# Patient Record
Sex: FEMALE | Race: WHITE | NOT HISPANIC OR LATINO | Employment: OTHER | URBAN - METROPOLITAN AREA
[De-identification: names, ages, dates, MRNs, and addresses within clinical notes are randomized per-mention and may not be internally consistent; named-entity substitution may affect disease eponyms.]

---

## 2018-06-06 ENCOUNTER — HOSPITAL ENCOUNTER (OUTPATIENT)
Dept: LAB | Facility: HOSPITAL | Age: 63
Discharge: HOME | End: 2018-06-06
Attending: FAMILY MEDICINE | Admitting: FAMILY MEDICINE

## 2018-06-06 LAB
ALBUMIN SERPL-MCNC: 4 G/DL (ref 3.5–4.8)
ALBUMIN/GLOB SERPL: 1.4 {RATIO} (ref 1–1.7)
ALP SERPL-CCNC: 59 IU/L (ref 32–91)
ALT SERPL-CCNC: 16 IU/L (ref 14–54)
ANION GAP SERPL CALC-SCNC: 9.9 MMOL/L (ref 10–20)
AST SERPL-CCNC: 20 IU/L (ref 15–41)
BASOPHILS # BLD AUTO: 0 10*3/UL (ref 0–0.2)
BASOPHILS NFR BLD AUTO: 1 % (ref 0–2)
BILIRUB SERPL-MCNC: 0.7 MG/DL (ref 0.3–1.2)
BUN SERPL-MCNC: 9 MG/DL (ref 8–20)
BUN/CREAT SERPL: 12.9 (ref 5.4–26.2)
CALCIUM SERPL-MCNC: 8.9 MG/DL (ref 8.9–10.3)
CHLORIDE SERPL-SCNC: 108 MMOL/L (ref 101–111)
CHOLEST SERPL-MCNC: 231 MG/DL
CHOLEST/HDLC SERPL: 4.3 {RATIO}
CONV CO2: 27 MMOL/L (ref 22–32)
CONV LDL CHOLESTEROL DIRECT: 154 MG/DL (ref 0–100)
CONV TOTAL PROTEIN: 6.8 G/DL (ref 6.1–7.9)
CREAT UR-MCNC: 0.7 MG/DL (ref 0.4–1)
DIFFERENTIAL METHOD BLD: (no result)
EOSINOPHIL # BLD AUTO: 0.2 10*3/UL (ref 0–0.3)
EOSINOPHIL # BLD AUTO: 4 % (ref 0–3)
ERYTHROCYTE [DISTWIDTH] IN BLOOD BY AUTOMATED COUNT: 13.5 % (ref 11.5–14.5)
GLOBULIN UR ELPH-MCNC: 2.8 G/DL (ref 2.5–3.8)
GLUCOSE SERPL-MCNC: 102 MG/DL (ref 65–99)
HCT VFR BLD AUTO: 39.2 % (ref 35–49)
HDLC SERPL-MCNC: 54 MG/DL
HGB BLD-MCNC: 13.3 G/DL (ref 12–15)
LDLC/HDLC SERPL: 2.8 {RATIO}
LIPID INTERPRETATION: ABNORMAL
LYMPHOCYTES # BLD AUTO: 1.4 10*3/UL (ref 0.8–4.8)
LYMPHOCYTES NFR BLD AUTO: 26 % (ref 18–42)
MCH RBC QN AUTO: 31.2 PG (ref 26–32)
MCHC RBC AUTO-ENTMCNC: 33.8 G/DL (ref 32–36)
MCV RBC AUTO: 92.3 FL (ref 80–94)
MONOCYTES # BLD AUTO: 0.3 10*3/UL (ref 0.1–1.3)
MONOCYTES NFR BLD AUTO: 6 % (ref 2–11)
NEUTROPHILS # BLD AUTO: 3.4 10*3/UL (ref 2.3–8.6)
NEUTROPHILS NFR BLD AUTO: 63 % (ref 50–75)
NRBC BLD AUTO-RTO: 0 /100{WBCS}
NRBC/RBC NFR BLD MANUAL: 0 10*3/UL
PLATELET # BLD AUTO: 176 10*3/UL (ref 150–450)
PMV BLD AUTO: 8.9 FL (ref 7.4–10.4)
POTASSIUM SERPL-SCNC: 3.9 MMOL/L (ref 3.6–5.1)
RBC # BLD AUTO: 4.25 10*6/UL (ref 4–5.4)
SODIUM SERPL-SCNC: 141 MMOL/L (ref 136–144)
TRIGL SERPL-MCNC: 142 MG/DL
VLDLC SERPL CALC-MCNC: 23.3 MG/DL
WBC # BLD AUTO: 5.4 10*3/UL (ref 4.5–11.5)

## 2020-07-24 ENCOUNTER — LAB (OUTPATIENT)
Dept: LAB | Facility: HOSPITAL | Age: 65
End: 2020-07-24

## 2020-07-24 ENCOUNTER — TRANSCRIBE ORDERS (OUTPATIENT)
Dept: LAB | Facility: HOSPITAL | Age: 65
End: 2020-07-24

## 2020-07-24 DIAGNOSIS — E78.81 LIPOID DERMATOARTHRITIS: ICD-10-CM

## 2020-07-24 DIAGNOSIS — E78.81 LIPOID DERMATOARTHRITIS: Primary | ICD-10-CM

## 2020-07-24 DIAGNOSIS — I10 ESSENTIAL HYPERTENSION: ICD-10-CM

## 2020-07-24 DIAGNOSIS — Z00.00 ROUTINE GENERAL MEDICAL EXAMINATION AT A HEALTH CARE FACILITY: ICD-10-CM

## 2020-07-24 LAB
ALBUMIN SERPL-MCNC: 4.4 G/DL (ref 3.5–5.2)
ALBUMIN/GLOB SERPL: 1.6 G/DL
ALP SERPL-CCNC: 56 U/L (ref 39–117)
ALT SERPL W P-5'-P-CCNC: 15 U/L (ref 1–33)
ANION GAP SERPL CALCULATED.3IONS-SCNC: 9.7 MMOL/L (ref 5–15)
AST SERPL-CCNC: 15 U/L (ref 1–32)
BASOPHILS # BLD AUTO: 0.02 10*3/MM3 (ref 0–0.2)
BASOPHILS NFR BLD AUTO: 0.4 % (ref 0–1.5)
BILIRUB SERPL-MCNC: 0.5 MG/DL (ref 0–1.2)
BUN SERPL-MCNC: 12 MG/DL (ref 8–23)
BUN/CREAT SERPL: 15.6 (ref 7–25)
CALCIUM SPEC-SCNC: 9.4 MG/DL (ref 8.6–10.5)
CHLORIDE SERPL-SCNC: 105 MMOL/L (ref 98–107)
CHOLEST SERPL-MCNC: 234 MG/DL (ref 0–200)
CO2 SERPL-SCNC: 25.3 MMOL/L (ref 22–29)
CREAT SERPL-MCNC: 0.77 MG/DL (ref 0.57–1)
DEPRECATED RDW RBC AUTO: 41.6 FL (ref 37–54)
EOSINOPHIL # BLD AUTO: 0.13 10*3/MM3 (ref 0–0.4)
EOSINOPHIL NFR BLD AUTO: 2.4 % (ref 0.3–6.2)
ERYTHROCYTE [DISTWIDTH] IN BLOOD BY AUTOMATED COUNT: 12.8 % (ref 12.3–15.4)
GFR SERPL CREATININE-BSD FRML MDRD: 75 ML/MIN/1.73
GLOBULIN UR ELPH-MCNC: 2.8 GM/DL
GLUCOSE SERPL-MCNC: 96 MG/DL (ref 65–99)
HCT VFR BLD AUTO: 36.9 % (ref 34–46.6)
HDLC SERPL-MCNC: 51 MG/DL (ref 40–60)
HGB BLD-MCNC: 12.6 G/DL (ref 12–15.9)
IMM GRANULOCYTES # BLD AUTO: 0.02 10*3/MM3 (ref 0–0.05)
IMM GRANULOCYTES NFR BLD AUTO: 0.4 % (ref 0–0.5)
LDLC SERPL CALC-MCNC: 148 MG/DL (ref 0–100)
LDLC/HDLC SERPL: 2.9 {RATIO}
LYMPHOCYTES # BLD AUTO: 1.42 10*3/MM3 (ref 0.7–3.1)
LYMPHOCYTES NFR BLD AUTO: 26.2 % (ref 19.6–45.3)
MCH RBC QN AUTO: 30.4 PG (ref 26.6–33)
MCHC RBC AUTO-ENTMCNC: 34.1 G/DL (ref 31.5–35.7)
MCV RBC AUTO: 88.9 FL (ref 79–97)
MONOCYTES # BLD AUTO: 0.4 10*3/MM3 (ref 0.1–0.9)
MONOCYTES NFR BLD AUTO: 7.4 % (ref 5–12)
NEUTROPHILS NFR BLD AUTO: 3.44 10*3/MM3 (ref 1.7–7)
NEUTROPHILS NFR BLD AUTO: 63.2 % (ref 42.7–76)
NRBC BLD AUTO-RTO: 0 /100 WBC (ref 0–0.2)
PLATELET # BLD AUTO: 179 10*3/MM3 (ref 140–450)
PMV BLD AUTO: 10.8 FL (ref 6–12)
POTASSIUM SERPL-SCNC: 3.9 MMOL/L (ref 3.5–5.2)
PROT SERPL-MCNC: 7.2 G/DL (ref 6–8.5)
RBC # BLD AUTO: 4.15 10*6/MM3 (ref 3.77–5.28)
SODIUM SERPL-SCNC: 140 MMOL/L (ref 136–145)
TRIGL SERPL-MCNC: 176 MG/DL (ref 0–150)
TSH SERPL DL<=0.05 MIU/L-ACNC: 1.99 UIU/ML (ref 0.27–4.2)
VLDLC SERPL-MCNC: 35.2 MG/DL (ref 5–40)
WBC # BLD AUTO: 5.43 10*3/MM3 (ref 3.4–10.8)

## 2020-07-24 PROCEDURE — 80061 LIPID PANEL: CPT

## 2020-07-24 PROCEDURE — 80053 COMPREHEN METABOLIC PANEL: CPT

## 2020-07-24 PROCEDURE — 36415 COLL VENOUS BLD VENIPUNCTURE: CPT

## 2020-07-24 PROCEDURE — 84443 ASSAY THYROID STIM HORMONE: CPT

## 2020-07-24 PROCEDURE — 85025 COMPLETE CBC W/AUTO DIFF WBC: CPT

## 2021-04-28 ENCOUNTER — LAB (OUTPATIENT)
Dept: LAB | Facility: HOSPITAL | Age: 66
End: 2021-04-28

## 2021-04-28 ENCOUNTER — TRANSCRIBE ORDERS (OUTPATIENT)
Dept: ADMINISTRATIVE | Facility: HOSPITAL | Age: 66
End: 2021-04-28

## 2021-04-28 DIAGNOSIS — F34.1 DYSTHYMIC DISORDER: ICD-10-CM

## 2021-04-28 DIAGNOSIS — I10 HYPERTENSION, UNSPECIFIED TYPE: ICD-10-CM

## 2021-04-28 DIAGNOSIS — E78.81 LIPOID DERMATOARTHRITIS: ICD-10-CM

## 2021-04-28 DIAGNOSIS — E78.81 LIPOID DERMATOARTHRITIS: Primary | ICD-10-CM

## 2021-04-28 LAB
ALBUMIN SERPL-MCNC: 4.1 G/DL (ref 3.5–5.2)
ALBUMIN/GLOB SERPL: 1.3 G/DL
ALP SERPL-CCNC: 74 U/L (ref 39–117)
ALT SERPL W P-5'-P-CCNC: 12 U/L (ref 1–33)
ANION GAP SERPL CALCULATED.3IONS-SCNC: 10.3 MMOL/L (ref 5–15)
AST SERPL-CCNC: 14 U/L (ref 1–32)
BASOPHILS # BLD AUTO: 0.02 10*3/MM3 (ref 0–0.2)
BASOPHILS NFR BLD AUTO: 0.4 % (ref 0–1.5)
BILIRUB SERPL-MCNC: 0.4 MG/DL (ref 0–1.2)
BUN SERPL-MCNC: 12 MG/DL (ref 8–23)
BUN/CREAT SERPL: 16 (ref 7–25)
CALCIUM SPEC-SCNC: 8.7 MG/DL (ref 8.6–10.5)
CHLORIDE SERPL-SCNC: 106 MMOL/L (ref 98–107)
CHOLEST SERPL-MCNC: 229 MG/DL (ref 0–200)
CO2 SERPL-SCNC: 25.7 MMOL/L (ref 22–29)
CREAT SERPL-MCNC: 0.75 MG/DL (ref 0.57–1)
DEPRECATED RDW RBC AUTO: 42.7 FL (ref 37–54)
EOSINOPHIL # BLD AUTO: 0.13 10*3/MM3 (ref 0–0.4)
EOSINOPHIL NFR BLD AUTO: 2.4 % (ref 0.3–6.2)
ERYTHROCYTE [DISTWIDTH] IN BLOOD BY AUTOMATED COUNT: 13.1 % (ref 12.3–15.4)
GFR SERPL CREATININE-BSD FRML MDRD: 77 ML/MIN/1.73
GLOBULIN UR ELPH-MCNC: 3.1 GM/DL
GLUCOSE SERPL-MCNC: 100 MG/DL (ref 65–99)
HCT VFR BLD AUTO: 36.6 % (ref 34–46.6)
HDLC SERPL-MCNC: 40 MG/DL (ref 40–60)
HGB BLD-MCNC: 12.4 G/DL (ref 12–15.9)
IMM GRANULOCYTES # BLD AUTO: 0.02 10*3/MM3 (ref 0–0.05)
IMM GRANULOCYTES NFR BLD AUTO: 0.4 % (ref 0–0.5)
LDLC SERPL CALC-MCNC: 151 MG/DL (ref 0–100)
LDLC/HDLC SERPL: 3.7 {RATIO}
LYMPHOCYTES # BLD AUTO: 1.14 10*3/MM3 (ref 0.7–3.1)
LYMPHOCYTES NFR BLD AUTO: 21 % (ref 19.6–45.3)
MCH RBC QN AUTO: 30.5 PG (ref 26.6–33)
MCHC RBC AUTO-ENTMCNC: 33.9 G/DL (ref 31.5–35.7)
MCV RBC AUTO: 90.1 FL (ref 79–97)
MONOCYTES # BLD AUTO: 0.45 10*3/MM3 (ref 0.1–0.9)
MONOCYTES NFR BLD AUTO: 8.3 % (ref 5–12)
NEUTROPHILS NFR BLD AUTO: 3.67 10*3/MM3 (ref 1.7–7)
NEUTROPHILS NFR BLD AUTO: 67.5 % (ref 42.7–76)
NRBC BLD AUTO-RTO: 0 /100 WBC (ref 0–0.2)
PLATELET # BLD AUTO: 209 10*3/MM3 (ref 140–450)
PMV BLD AUTO: 10.6 FL (ref 6–12)
POTASSIUM SERPL-SCNC: 4.2 MMOL/L (ref 3.5–5.2)
PROT SERPL-MCNC: 7.2 G/DL (ref 6–8.5)
RBC # BLD AUTO: 4.06 10*6/MM3 (ref 3.77–5.28)
SODIUM SERPL-SCNC: 142 MMOL/L (ref 136–145)
TRIGL SERPL-MCNC: 206 MG/DL (ref 0–150)
TSH SERPL DL<=0.05 MIU/L-ACNC: 2.97 UIU/ML (ref 0.27–4.2)
VLDLC SERPL-MCNC: 38 MG/DL (ref 5–40)
WBC # BLD AUTO: 5.43 10*3/MM3 (ref 3.4–10.8)

## 2021-04-28 PROCEDURE — 80053 COMPREHEN METABOLIC PANEL: CPT

## 2021-04-28 PROCEDURE — 80061 LIPID PANEL: CPT

## 2021-04-28 PROCEDURE — 85025 COMPLETE CBC W/AUTO DIFF WBC: CPT

## 2021-04-28 PROCEDURE — 36415 COLL VENOUS BLD VENIPUNCTURE: CPT

## 2021-04-28 PROCEDURE — 84443 ASSAY THYROID STIM HORMONE: CPT

## 2021-10-29 ENCOUNTER — TRANSCRIBE ORDERS (OUTPATIENT)
Dept: ADMINISTRATIVE | Facility: HOSPITAL | Age: 66
End: 2021-10-29

## 2021-10-29 ENCOUNTER — LAB (OUTPATIENT)
Dept: LAB | Facility: HOSPITAL | Age: 66
End: 2021-10-29

## 2021-10-29 DIAGNOSIS — I10 ESSENTIAL HYPERTENSION, MALIGNANT: ICD-10-CM

## 2021-10-29 DIAGNOSIS — F34.1 DYSTHYMIC DISORDER: ICD-10-CM

## 2021-10-29 DIAGNOSIS — E78.81 LIPOID DERMATOARTHRITIS: ICD-10-CM

## 2021-10-29 DIAGNOSIS — E78.81 LIPOID DERMATOARTHRITIS: Primary | ICD-10-CM

## 2021-10-29 LAB
25(OH)D3 SERPL-MCNC: 25.8 NG/ML
ALBUMIN SERPL-MCNC: 4.6 G/DL (ref 3.5–5.2)
ALBUMIN/GLOB SERPL: 1.8 G/DL
ALP SERPL-CCNC: 68 U/L (ref 39–117)
ALT SERPL W P-5'-P-CCNC: 14 U/L (ref 1–33)
ANION GAP SERPL CALCULATED.3IONS-SCNC: 10.2 MMOL/L (ref 5–15)
AST SERPL-CCNC: 13 U/L (ref 1–32)
BILIRUB SERPL-MCNC: 0.4 MG/DL (ref 0–1.2)
BUN SERPL-MCNC: 17 MG/DL (ref 8–23)
BUN/CREAT SERPL: 20.5 (ref 7–25)
CALCIUM SPEC-SCNC: 9 MG/DL (ref 8.6–10.5)
CHLORIDE SERPL-SCNC: 104 MMOL/L (ref 98–107)
CHOLEST SERPL-MCNC: 263 MG/DL (ref 0–200)
CO2 SERPL-SCNC: 25.8 MMOL/L (ref 22–29)
CREAT SERPL-MCNC: 0.83 MG/DL (ref 0.57–1)
DEPRECATED RDW RBC AUTO: 42.8 FL (ref 37–54)
ERYTHROCYTE [DISTWIDTH] IN BLOOD BY AUTOMATED COUNT: 13 % (ref 12.3–15.4)
GFR SERPL CREATININE-BSD FRML MDRD: 69 ML/MIN/1.73
GLOBULIN UR ELPH-MCNC: 2.6 GM/DL
GLUCOSE SERPL-MCNC: 98 MG/DL (ref 65–99)
HCT VFR BLD AUTO: 38.3 % (ref 34–46.6)
HDLC SERPL-MCNC: 51 MG/DL (ref 40–60)
HGB BLD-MCNC: 13.1 G/DL (ref 12–15.9)
LDLC SERPL CALC-MCNC: 188 MG/DL (ref 0–100)
LDLC/HDLC SERPL: 3.64 {RATIO}
MCH RBC QN AUTO: 31 PG (ref 26.6–33)
MCHC RBC AUTO-ENTMCNC: 34.2 G/DL (ref 31.5–35.7)
MCV RBC AUTO: 90.5 FL (ref 79–97)
PLATELET # BLD AUTO: 183 10*3/MM3 (ref 140–450)
PMV BLD AUTO: 10.7 FL (ref 6–12)
POTASSIUM SERPL-SCNC: 4.2 MMOL/L (ref 3.5–5.2)
PROT SERPL-MCNC: 7.2 G/DL (ref 6–8.5)
RBC # BLD AUTO: 4.23 10*6/MM3 (ref 3.77–5.28)
SODIUM SERPL-SCNC: 140 MMOL/L (ref 136–145)
TRIGL SERPL-MCNC: 132 MG/DL (ref 0–150)
TSH SERPL DL<=0.05 MIU/L-ACNC: 2.09 UIU/ML (ref 0.27–4.2)
VLDLC SERPL-MCNC: 24 MG/DL (ref 5–40)
WBC # BLD AUTO: 5.39 10*3/MM3 (ref 3.4–10.8)

## 2021-10-29 PROCEDURE — 82306 VITAMIN D 25 HYDROXY: CPT

## 2021-10-29 PROCEDURE — 80053 COMPREHEN METABOLIC PANEL: CPT

## 2021-10-29 PROCEDURE — 80061 LIPID PANEL: CPT

## 2021-10-29 PROCEDURE — 36415 COLL VENOUS BLD VENIPUNCTURE: CPT

## 2021-10-29 PROCEDURE — 85027 COMPLETE CBC AUTOMATED: CPT

## 2021-10-29 PROCEDURE — 84443 ASSAY THYROID STIM HORMONE: CPT

## 2022-02-02 ENCOUNTER — LAB (OUTPATIENT)
Dept: LAB | Facility: HOSPITAL | Age: 67
End: 2022-02-02

## 2022-02-02 ENCOUNTER — TRANSCRIBE ORDERS (OUTPATIENT)
Dept: ADMINISTRATIVE | Facility: HOSPITAL | Age: 67
End: 2022-02-02

## 2022-02-02 DIAGNOSIS — Z11.52 ENCOUNTER FOR SCREENING FOR SEVERE ACUTE RESPIRATORY SYNDROME CORONAVIRUS 2 (SARS-COV-2) INFECTION: Primary | ICD-10-CM

## 2022-02-02 DIAGNOSIS — Z11.52 ENCOUNTER FOR SCREENING FOR SEVERE ACUTE RESPIRATORY SYNDROME CORONAVIRUS 2 (SARS-COV-2) INFECTION: ICD-10-CM

## 2022-02-02 LAB — SARS-COV-2 ORF1AB RESP QL NAA+PROBE: DETECTED

## 2022-02-02 PROCEDURE — U0004 COV-19 TEST NON-CDC HGH THRU: HCPCS

## 2022-02-02 PROCEDURE — U0005 INFEC AGEN DETEC AMPLI PROBE: HCPCS

## 2022-02-02 PROCEDURE — C9803 HOPD COVID-19 SPEC COLLECT: HCPCS

## 2022-09-18 NOTE — PROGRESS NOTES
Encounter Date:09/21/2022      Patient ID: Deepika Sarabia is a 67 y.o. female.    Chief Complaint:  Hypertension  Dyslipidemia  Family history of coronary artery disease    History of Present Illness  The patient is a pleasant 67-year-old white female is here for evaluation of above problems.  Patient is asymptomatic.  Patient has strong family history of coronary artery disease.  Several members in the family had heart transplantation.  Patient has history of hypertension and dyslipidemia.    The patient has been without any chest discomfort ,shortness of breath, palpitations, dizziness or syncope.  Denies having any headache ,abdominal pain ,nausea, vomiting , diarrhea constipation, loss of weight or loss of appetite.  Denies having any excessive bruising ,hematuria or blood in the stool.    Review of all systems negative except as indicated.    Reviewed ROS.    Assessment and Plan     ]]]]]]]]]]]]]]]]]]]]  Impression  =========  -Hypertension dyslipidemia  Normal echocardiogram 2008    - Denies any surgical problems.    - Strong family history of heart disease.  Several members of the family had heart transplantation.    - Non-smoker    - No known allergies  =============  Plan  ===========  Patient has strong family history of coronary artery disease.  Patient is asymptomatic without any cardiac or noncardiac symptoms at this time.  EKG showed sinus rhythm without ischemic changes.    Hypertension-blood pressure is well controlled.  Blood pressure 124/80.  Patient is on losartan.    Dyslipidemia-recent total cholesterol 270 and .  Patient prefers to not go on any medications.  Diet and exercise was discussed.  Have indicated to her about controlling her cholesterol better especially in view of significant family history of heart problems and coronary artery disease and heart transplantation.    Have discussed with her about having screening treadmill test and possibly an echocardiogram.  Patient would  like to hold off on the testing at this time.  Echocardiogram from 2008 was reviewed and was normal.    Medications were reviewed and updated.    Follow-up in the office in 6 months.  Further plan will depend on patient's progress.  ]]]]]]]]]]]]]]]]]]]               Diagnosis Plan   1. Essential hypertension     2. Dyslipidemia     3. Family history of coronary artery disease     LAB RESULTS (LAST 7 DAYS)    CBC        BMP        CMP         BNP        TROPONIN        CoAg        Creatinine Clearance  CrCl cannot be calculated (Patient's most recent lab result is older than the maximum 30 days allowed.).    ABG        Radiology  No radiology results for the last day                The following portions of the patient's history were reviewed and updated as appropriate: allergies, current medications, past family history, past medical history, past social history, past surgical history and problem list.    Review of Systems   Constitutional: Negative for fever and malaise/fatigue.   Cardiovascular: Negative for chest pain, dyspnea on exertion and palpitations.   Respiratory: Negative for cough and shortness of breath.    Skin: Negative for rash.   Gastrointestinal: Negative for abdominal pain, nausea and vomiting.   Neurological: Negative for focal weakness and headaches.   All other systems reviewed and are negative.        Current Outpatient Medications:   •  Ibuprofen (ADVIL PO), ADVIL CAPS, Disp: , Rfl:   •  losartan (COZAAR) 100 MG tablet, , Disp: , Rfl:   •  sertraline (ZOLOFT) 50 MG tablet, , Disp: , Rfl:     No Known Allergies    Family History   Problem Relation Age of Onset   • Heart disease Mother         Cardiomyopathy   • Heart disease Brother         Heart transplant   • Heart disease Brother         Heart transplant   • Heart disease Brother         Heart transplant       History reviewed. No pertinent surgical history.    Past Medical History:   Diagnosis Date   • Hyperlipidemia    • Hypertension   "      Family History   Problem Relation Age of Onset   • Heart disease Mother         Cardiomyopathy   • Heart disease Brother         Heart transplant   • Heart disease Brother         Heart transplant   • Heart disease Brother         Heart transplant       Social History     Socioeconomic History   • Marital status:    Tobacco Use   • Smoking status: Never Smoker   • Smokeless tobacco: Never Used   Vaping Use   • Vaping Use: Never used   Substance and Sexual Activity   • Alcohol use: Never   • Drug use: Never   • Sexual activity: Not Currently     Partners: Female     Birth control/protection: Pill, Post-menopausal           ECG 12 Lead    Date/Time: 9/21/2022 4:22 PM  Performed by: Gaudencio Young MD  Authorized by: Gaudencio Young MD   Comparison: compared with previous ECG   Comparison to previous ECG: Normal sinus rhythm nonspecific ST-T wave changes 75/min no ectopy no prior tracing for comparison.                Objective:       Physical Exam    /80 (BP Location: Right arm, Patient Position: Sitting, Cuff Size: Adult)   Pulse 73   Ht 163.8 cm (64.5\")   Wt 93.1 kg (205 lb 3.2 oz)   SpO2 98%   BMI 34.68 kg/m²   The patient is alert, oriented and in no distress.    Vital signs as noted above.    Head and neck revealed no carotid bruits or jugular venous distension.  No thyromegaly or lymphadenopathy is present.    Lungs clear.  No wheezing.  Breath sounds are normal bilaterally.    Heart normal first and second heart sounds.  No murmur..  No pericardial rub is present.  No gallop is present.    Abdomen soft and nontender.  No organomegaly is present.    Extremities revealed good peripheral pulses without any pedal edema.    Skin warm and dry.    Musculoskeletal system is grossly normal.    CNS grossly normal.    Reviewed and updated.        "

## 2022-09-21 ENCOUNTER — OFFICE VISIT (OUTPATIENT)
Dept: CARDIOLOGY | Facility: CLINIC | Age: 67
End: 2022-09-21

## 2022-09-21 VITALS
SYSTOLIC BLOOD PRESSURE: 124 MMHG | BODY MASS INDEX: 34.19 KG/M2 | HEART RATE: 73 BPM | HEIGHT: 65 IN | OXYGEN SATURATION: 98 % | WEIGHT: 205.2 LBS | DIASTOLIC BLOOD PRESSURE: 80 MMHG

## 2022-09-21 DIAGNOSIS — Z82.49 FAMILY HISTORY OF CORONARY ARTERY DISEASE: ICD-10-CM

## 2022-09-21 DIAGNOSIS — E78.5 DYSLIPIDEMIA: ICD-10-CM

## 2022-09-21 DIAGNOSIS — I10 ESSENTIAL HYPERTENSION: Primary | ICD-10-CM

## 2022-09-21 PROCEDURE — 93000 ELECTROCARDIOGRAM COMPLETE: CPT | Performed by: INTERNAL MEDICINE

## 2022-09-21 PROCEDURE — 99204 OFFICE O/P NEW MOD 45 MIN: CPT | Performed by: INTERNAL MEDICINE

## 2022-09-21 RX ORDER — LOSARTAN POTASSIUM 100 MG/1
TABLET ORAL
COMMUNITY
Start: 2022-07-18

## 2022-09-21 RX ORDER — LOSARTAN POTASSIUM 25 MG/1
TABLET ORAL
COMMUNITY
Start: 2012-03-08 | End: 2022-09-21 | Stop reason: DRUGHIGH

## 2022-12-13 ENCOUNTER — OFFICE (OUTPATIENT)
Dept: URBAN - METROPOLITAN AREA PATHOLOGY 4 | Facility: PATHOLOGY | Age: 67
End: 2022-12-13

## 2022-12-13 ENCOUNTER — OFFICE (OUTPATIENT)
Dept: URBAN - METROPOLITAN AREA PATHOLOGY 4 | Facility: PATHOLOGY | Age: 67
End: 2022-12-13
Payer: COMMERCIAL

## 2022-12-13 ENCOUNTER — ON CAMPUS - OUTPATIENT (OUTPATIENT)
Dept: URBAN - METROPOLITAN AREA HOSPITAL 2 | Facility: HOSPITAL | Age: 67
End: 2022-12-13

## 2022-12-13 VITALS
DIASTOLIC BLOOD PRESSURE: 89 MMHG | OXYGEN SATURATION: 96 % | TEMPERATURE: 97 F | SYSTOLIC BLOOD PRESSURE: 158 MMHG | SYSTOLIC BLOOD PRESSURE: 86 MMHG | OXYGEN SATURATION: 100 % | OXYGEN SATURATION: 98 % | DIASTOLIC BLOOD PRESSURE: 82 MMHG | WEIGHT: 200 LBS | DIASTOLIC BLOOD PRESSURE: 53 MMHG | HEART RATE: 73 BPM | SYSTOLIC BLOOD PRESSURE: 97 MMHG | SYSTOLIC BLOOD PRESSURE: 112 MMHG | SYSTOLIC BLOOD PRESSURE: 107 MMHG | HEART RATE: 67 BPM | SYSTOLIC BLOOD PRESSURE: 83 MMHG | RESPIRATION RATE: 15 BRPM | DIASTOLIC BLOOD PRESSURE: 134 MMHG | RESPIRATION RATE: 17 BRPM | DIASTOLIC BLOOD PRESSURE: 67 MMHG | DIASTOLIC BLOOD PRESSURE: 51 MMHG | HEIGHT: 64 IN | SYSTOLIC BLOOD PRESSURE: 130 MMHG | DIASTOLIC BLOOD PRESSURE: 74 MMHG | OXYGEN SATURATION: 99 % | DIASTOLIC BLOOD PRESSURE: 55 MMHG | HEART RATE: 66 BPM | SYSTOLIC BLOOD PRESSURE: 128 MMHG | HEART RATE: 69 BPM | RESPIRATION RATE: 16 BRPM

## 2022-12-13 DIAGNOSIS — K62.1 RECTAL POLYP: ICD-10-CM

## 2022-12-13 DIAGNOSIS — Z12.11 ENCOUNTER FOR SCREENING FOR MALIGNANT NEOPLASM OF COLON: ICD-10-CM

## 2022-12-13 LAB
GI HISTOLOGY: A. UNSPECIFIED: (no result)
GI HISTOLOGY: PDF REPORT: (no result)

## 2022-12-13 PROCEDURE — 88305 TISSUE EXAM BY PATHOLOGIST: CPT | Mod: 26 | Performed by: INTERNAL MEDICINE

## 2022-12-13 PROCEDURE — 45385 COLONOSCOPY W/LESION REMOVAL: CPT | Mod: PT | Performed by: INTERNAL MEDICINE

## 2023-03-12 NOTE — PROGRESS NOTES
Encounter Date:03/22/2023    Last seen 9/21/2022      Patient ID: Deepika Sarabia is a 67 y.o. female.    Chief Complaint:    Hypertension  Dyslipidemia  Family history of coronary artery disease     History of Present Illness  Patient had 1 brief episode lasting for few seconds of A-fib as indicated by Apple Watch.  Patient was asymptomatic.    Since I have last seen, the patient has been without any chest discomfort ,shortness of breath, palpitations, dizziness or syncope.  Denies having any headache ,abdominal pain ,nausea, vomiting , diarrhea constipation, loss of weight or loss of appetite.  Denies having any excessive bruising ,hematuria or blood in the stool.    Review of all systems negative except as indicated.    Reviewed ROS.  Assessment and Plan      ]]]]]]]]]]]]]]]]]]]]  Impression  =========  -Hypertension dyslipidemia.    Normal echocardiogram 2008     - Denies any surgical problems.     - Strong family history of heart disease.  Several members of the family had heart transplantation.     - Non-smoker     - No known allergies  =============  Plan  ===========  Patient has strong family history of coronary artery disease.  Patient is asymptomatic without any cardiac or noncardiac symptoms at this time.  EKG showed sinus rhythm without ischemic changes.  3/22/2023     Hypertension-blood pressure is well controlled.  Blood pressure 134/84  Patient is on losartan.     Dyslipidemia-recent total cholesterol 270 and .  Patient prefers to not go on any medications.  Diet and exercise was discussed.  Have indicated to her about controlling her cholesterol better especially in view of significant family history of heart problems and coronary artery disease and heart transplantation.     Apple Watch indicated 1 short episode of A-fib lasting for few seconds.  No other rhythm issues reported.  Patient was asymptomatic.    Medications were reviewed and updated.     Follow-up in the office in 6  months.    Further plan will depend on patient's progress.  ]]]]]]]]]]]]]]]]]]]                 Diagnosis Plan   1. Essential hypertension        2. Dyslipidemia        3. Family history of coronary artery disease        LAB RESULTS (LAST 7 DAYS)    CBC        BMP        CMP         BNP        TROPONIN        CoAg        Creatinine Clearance  CrCl cannot be calculated (Patient's most recent lab result is older than the maximum 30 days allowed.).    ABG        Radiology  No radiology results for the last day                The following portions of the patient's history were reviewed and updated as appropriate: allergies, current medications, past family history, past medical history, past social history, past surgical history and problem list.    Review of Systems   Constitutional: Negative for malaise/fatigue.   Cardiovascular: Negative for chest pain, dyspnea on exertion, leg swelling and palpitations.   Respiratory: Negative for cough and shortness of breath.    Gastrointestinal: Negative for abdominal pain, nausea and vomiting.   Neurological: Negative for dizziness, focal weakness, headaches, light-headedness and numbness.   All other systems reviewed and are negative.        Current Outpatient Medications:   •  Ibuprofen (ADVIL PO), ADVIL CAPS, Disp: , Rfl:   •  losartan (COZAAR) 100 MG tablet, , Disp: , Rfl:   •  sertraline (ZOLOFT) 50 MG tablet, , Disp: , Rfl:     No Known Allergies    Family History   Problem Relation Age of Onset   • Heart disease Mother         Cardiomyopathy   • Heart disease Brother         Heart transplant   • Heart disease Brother         Heart transplant   • Heart disease Brother         Heart transplant       No past surgical history on file.    Past Medical History:   Diagnosis Date   • Hyperlipidemia    • Hypertension        Family History   Problem Relation Age of Onset   • Heart disease Mother         Cardiomyopathy   • Heart disease Brother         Heart transplant   • Heart  disease Brother         Heart transplant   • Heart disease Brother         Heart transplant       Social History     Socioeconomic History   • Marital status:    Tobacco Use   • Smoking status: Never   • Smokeless tobacco: Never   Vaping Use   • Vaping Use: Never used   Substance and Sexual Activity   • Alcohol use: Never   • Drug use: Never   • Sexual activity: Not Currently     Partners: Female     Birth control/protection: Pill, Post-menopausal           ECG 12 Lead    Date/Time: 3/22/2023 2:21 PM  Performed by: Gaudencio Young MD  Authorized by: Gaudencio Young MD   Comparison: compared with previous ECG   Similar to previous ECG  Comparison to previous ECG: Normal sinus rhythm nonspecific ST-T wave changes normal axis normal intervals no ectopy 77/min no significant change from 9/21/2022                Objective:       Physical Exam    There were no vitals taken for this visit.  The patient is alert, oriented and in no distress.    Vital signs as noted above.    Head and neck revealed no carotid bruits or jugular venous distension.  No thyromegaly or lymphadenopathy is present.    Lungs clear.  No wheezing.  Breath sounds are normal bilaterally.    Heart normal first and second heart sounds.  No murmur..  No pericardial rub is present.  No gallop is present.    Abdomen soft and nontender.  No organomegaly is present.    Extremities revealed good peripheral pulses without any pedal edema.    Skin warm and dry.    Musculoskeletal system is grossly normal.    CNS grossly normal.    Reviewed and updated.

## 2023-03-22 ENCOUNTER — OFFICE VISIT (OUTPATIENT)
Dept: CARDIOLOGY | Facility: CLINIC | Age: 68
End: 2023-03-22
Payer: MEDICARE

## 2023-03-22 VITALS
OXYGEN SATURATION: 98 % | HEART RATE: 74 BPM | DIASTOLIC BLOOD PRESSURE: 84 MMHG | WEIGHT: 199 LBS | BODY MASS INDEX: 33.15 KG/M2 | HEIGHT: 65 IN | SYSTOLIC BLOOD PRESSURE: 134 MMHG

## 2023-03-22 DIAGNOSIS — E78.5 DYSLIPIDEMIA: ICD-10-CM

## 2023-03-22 DIAGNOSIS — I10 ESSENTIAL HYPERTENSION: Primary | ICD-10-CM

## 2023-03-22 DIAGNOSIS — Z82.49 FAMILY HISTORY OF CORONARY ARTERY DISEASE: ICD-10-CM

## 2023-03-22 PROCEDURE — 99214 OFFICE O/P EST MOD 30 MIN: CPT | Performed by: INTERNAL MEDICINE

## 2023-03-22 PROCEDURE — 1160F RVW MEDS BY RX/DR IN RCRD: CPT | Performed by: INTERNAL MEDICINE

## 2023-03-22 PROCEDURE — 93000 ELECTROCARDIOGRAM COMPLETE: CPT | Performed by: INTERNAL MEDICINE

## 2023-03-22 PROCEDURE — 1159F MED LIST DOCD IN RCRD: CPT | Performed by: INTERNAL MEDICINE

## 2023-09-27 ENCOUNTER — OFFICE VISIT (OUTPATIENT)
Dept: CARDIOLOGY | Facility: CLINIC | Age: 68
End: 2023-09-27
Payer: MEDICARE

## 2023-09-27 VITALS
WEIGHT: 195 LBS | OXYGEN SATURATION: 97 % | DIASTOLIC BLOOD PRESSURE: 85 MMHG | HEART RATE: 67 BPM | BODY MASS INDEX: 33.29 KG/M2 | HEIGHT: 64 IN | SYSTOLIC BLOOD PRESSURE: 168 MMHG

## 2023-09-27 DIAGNOSIS — I10 ESSENTIAL HYPERTENSION: Primary | ICD-10-CM

## 2023-09-27 DIAGNOSIS — Z82.49 FAMILY HISTORY OF CORONARY ARTERY DISEASE: ICD-10-CM

## 2023-09-27 DIAGNOSIS — E78.5 DYSLIPIDEMIA: ICD-10-CM

## 2023-09-27 PROBLEM — E78.00 PURE HYPERCHOLESTEROLEMIA: Status: ACTIVE | Noted: 2023-09-27

## 2023-09-27 PROCEDURE — 3077F SYST BP >= 140 MM HG: CPT | Performed by: INTERNAL MEDICINE

## 2023-09-27 PROCEDURE — 93000 ELECTROCARDIOGRAM COMPLETE: CPT | Performed by: INTERNAL MEDICINE

## 2023-09-27 PROCEDURE — 1160F RVW MEDS BY RX/DR IN RCRD: CPT | Performed by: INTERNAL MEDICINE

## 2023-09-27 PROCEDURE — 1159F MED LIST DOCD IN RCRD: CPT | Performed by: INTERNAL MEDICINE

## 2023-09-27 PROCEDURE — 99214 OFFICE O/P EST MOD 30 MIN: CPT | Performed by: INTERNAL MEDICINE

## 2023-09-27 PROCEDURE — 3079F DIAST BP 80-89 MM HG: CPT | Performed by: INTERNAL MEDICINE

## 2023-09-27 RX ORDER — AMLODIPINE BESYLATE 5 MG/1
TABLET ORAL
COMMUNITY
Start: 2023-09-20

## 2023-09-27 NOTE — PROGRESS NOTES
Encounter Date:09/27/2023  Last seen 3/22/2023      Patient ID: Deepika Sarabia is a 68 y.o. female.    Chief Complaint:     Hypertension  Dyslipidemia  Family history of coronary artery disease     History of Present Illness  Since I have last seen, the patient has been without any chest discomfort ,shortness of breath, palpitations, dizziness or syncope.  Denies having any headache ,abdominal pain ,nausea, vomiting , diarrhea constipation, loss of weight or loss of appetite.  Denies having any excessive bruising ,hematuria or blood in the stool.    Review of all systems negative except as indicated.    Reviewed ROS.  Assessment and Plan      ]]]]]]]]]]]]]]]]]]]]  History  =========  -Hypertension dyslipidemia.     Normal echocardiogram 2008     - Denies any surgical problems.     - Strong family history of heart disease.  Several members of the family had heart transplantation.     - Non-smoker     - No known allergies  =============  Plan  ===========  Patient has strong family history of coronary artery disease.  Patient is asymptomatic without any cardiac or noncardiac symptoms at this time.  EKG showed sinus rhythm without ischemic changes.  9/27/2023    Hypertension-blood pressure is not well controlled.  Blood pressure 168/85  Patient is on losartan and recently was started on amlodipine for as needed usage.  (Dr. Lujan).  Patient was asked to take amlodipine every day.  Patient apparently is under a lot of stress taking care of 90-year-old father and stepmother..     Dyslipidemia-recent total cholesterol 270 and .  Patient prefers to not go on any medications.  Diet and exercise was discussed.  Have indicated to her about controlling her cholesterol better especially in view of significant family history of heart problems and coronary artery disease and heart transplantation.     Apple Watch indicated 1 short episode of A-fib lasting for few seconds.  No other rhythm issues reported.  Patient was  asymptomatic.     Medications were reviewed and updated.     Follow-up in the office in 6 months.     Further plan will depend on patient's progress.    Reviewed and updated-9/27/2023  ]]]]]]]]]]]]]]]]]]]             Diagnosis Plan   1. Essential hypertension        2. Dyslipidemia        3. Family history of coronary artery disease        LAB RESULTS (LAST 7 DAYS)    CBC        BMP        CMP         BNP        TROPONIN        CoAg        Creatinine Clearance  CrCl cannot be calculated (Patient's most recent lab result is older than the maximum 30 days allowed.).    ABG        Radiology  No radiology results for the last day                The following portions of the patient's history were reviewed and updated as appropriate: allergies, current medications, past family history, past medical history, past social history, past surgical history, and problem list.    Review of Systems   Constitutional: Negative for malaise/fatigue.   Cardiovascular:  Negative for chest pain, leg swelling, palpitations and syncope.   Respiratory:  Negative for shortness of breath.    Skin:  Negative for rash.   Gastrointestinal:  Negative for nausea and vomiting.   Neurological:  Negative for dizziness, light-headedness and numbness.   All other systems reviewed and are negative.      Current Outpatient Medications:     amLODIPine (NORVASC) 5 MG tablet, , Disp: , Rfl:     Ibuprofen (ADVIL PO), ADVIL CAPS, Disp: , Rfl:     losartan (COZAAR) 100 MG tablet, , Disp: , Rfl:     sertraline (ZOLOFT) 50 MG tablet, , Disp: , Rfl:     No Known Allergies    Family History   Problem Relation Age of Onset    Heart disease Mother         Cardiomyopathy    Heart disease Brother         Heart transplant    Heart disease Brother         Heart transplant    Heart disease Brother         Heart transplant       No past surgical history on file.    Past Medical History:   Diagnosis Date    Hyperlipidemia     Hypertension        Family History   Problem  "Relation Age of Onset    Heart disease Mother         Cardiomyopathy    Heart disease Brother         Heart transplant    Heart disease Brother         Heart transplant    Heart disease Brother         Heart transplant       Social History     Socioeconomic History    Marital status:    Tobacco Use    Smoking status: Never     Passive exposure: Never    Smokeless tobacco: Never   Vaping Use    Vaping Use: Never used   Substance and Sexual Activity    Alcohol use: Never    Drug use: Never    Sexual activity: Not Currently     Partners: Male     Birth control/protection: Pill, Post-menopausal           ECG 12 Lead    Date/Time: 9/27/2023 1:16 PM  Performed by: Gaudencio Young MD  Authorized by: Gaudencio Young MD   Comparison: compared with previous ECG   Similar to previous ECG  Comparison to previous ECG: Normal sinus rhythm normal ECG 63/min normal axis normal intervals no ectopy no significant change from 3/22/2023        Objective:       Physical Exam    /85 (BP Location: Left arm, Patient Position: Sitting, Cuff Size: Adult)   Pulse 67   Ht 162.6 cm (64\")   Wt 88.5 kg (195 lb)   SpO2 97%   BMI 33.47 kg/m²   The patient is alert, oriented and in no distress.    Vital signs as noted above.    Head and neck revealed no carotid bruits or jugular venous distension.  No thyromegaly or lymphadenopathy is present.    Lungs clear.  No wheezing.  Breath sounds are normal bilaterally.    Heart normal first and second heart sounds.  No murmur..  No pericardial rub is present.  No gallop is present.    Abdomen soft and nontender.  No organomegaly is present.    Extremities revealed good peripheral pulses without any pedal edema.    Skin warm and dry.    Musculoskeletal system is grossly normal.    CNS grossly normal.    Reviewed and updated.        "

## 2024-03-13 ENCOUNTER — LAB (OUTPATIENT)
Dept: LAB | Facility: HOSPITAL | Age: 69
End: 2024-03-13
Payer: MEDICARE

## 2024-03-13 ENCOUNTER — TRANSCRIBE ORDERS (OUTPATIENT)
Dept: LAB | Facility: HOSPITAL | Age: 69
End: 2024-03-13
Payer: MEDICARE

## 2024-03-13 DIAGNOSIS — R73.03 DIABETES MELLITUS, LATENT: ICD-10-CM

## 2024-03-13 DIAGNOSIS — I10 PRIMARY HYPERTENSION: ICD-10-CM

## 2024-03-13 DIAGNOSIS — E78.81 LIPOID DERMATOARTHRITIS: ICD-10-CM

## 2024-03-13 DIAGNOSIS — F34.1 DYSTHYMIC DISORDER: ICD-10-CM

## 2024-03-13 DIAGNOSIS — E55.9 VITAMIN D DEFICIENCY: Primary | ICD-10-CM

## 2024-03-13 DIAGNOSIS — E55.9 VITAMIN D DEFICIENCY: ICD-10-CM

## 2024-03-13 LAB
25(OH)D3 SERPL-MCNC: 34.5 NG/ML (ref 30–100)
ALBUMIN SERPL-MCNC: 4.5 G/DL (ref 3.5–5.2)
ALBUMIN/GLOB SERPL: 2 G/DL
ALP SERPL-CCNC: 76 U/L (ref 39–117)
ALT SERPL W P-5'-P-CCNC: 14 U/L (ref 1–33)
ANION GAP SERPL CALCULATED.3IONS-SCNC: 10 MMOL/L (ref 5–15)
AST SERPL-CCNC: 15 U/L (ref 1–32)
BASOPHILS # BLD AUTO: 0.02 10*3/MM3 (ref 0–0.2)
BASOPHILS NFR BLD AUTO: 0.4 % (ref 0–1.5)
BILIRUB SERPL-MCNC: 0.6 MG/DL (ref 0–1.2)
BUN SERPL-MCNC: 13 MG/DL (ref 8–23)
BUN/CREAT SERPL: 14.6 (ref 7–25)
CALCIUM SPEC-SCNC: 9.4 MG/DL (ref 8.6–10.5)
CHLORIDE SERPL-SCNC: 104 MMOL/L (ref 98–107)
CHOLEST SERPL-MCNC: 251 MG/DL (ref 0–200)
CO2 SERPL-SCNC: 27 MMOL/L (ref 22–29)
CREAT SERPL-MCNC: 0.89 MG/DL (ref 0.57–1)
DEPRECATED RDW RBC AUTO: 43.5 FL (ref 37–54)
EGFRCR SERPLBLD CKD-EPI 2021: 70.3 ML/MIN/1.73
EOSINOPHIL # BLD AUTO: 0.12 10*3/MM3 (ref 0–0.4)
EOSINOPHIL NFR BLD AUTO: 2.2 % (ref 0.3–6.2)
ERYTHROCYTE [DISTWIDTH] IN BLOOD BY AUTOMATED COUNT: 13.2 % (ref 12.3–15.4)
GLOBULIN UR ELPH-MCNC: 2.3 GM/DL
GLUCOSE SERPL-MCNC: 98 MG/DL (ref 65–99)
HBA1C MFR BLD: 5.6 % (ref 4.8–5.6)
HCT VFR BLD AUTO: 40.5 % (ref 34–46.6)
HDLC SERPL-MCNC: 55 MG/DL (ref 40–60)
HGB BLD-MCNC: 13.6 G/DL (ref 12–15.9)
IMM GRANULOCYTES # BLD AUTO: 0.01 10*3/MM3 (ref 0–0.05)
IMM GRANULOCYTES NFR BLD AUTO: 0.2 % (ref 0–0.5)
LDLC SERPL CALC-MCNC: 177 MG/DL (ref 0–100)
LDLC/HDLC SERPL: 3.17 {RATIO}
LYMPHOCYTES # BLD AUTO: 1.27 10*3/MM3 (ref 0.7–3.1)
LYMPHOCYTES NFR BLD AUTO: 23.7 % (ref 19.6–45.3)
MCH RBC QN AUTO: 30.4 PG (ref 26.6–33)
MCHC RBC AUTO-ENTMCNC: 33.6 G/DL (ref 31.5–35.7)
MCV RBC AUTO: 90.6 FL (ref 79–97)
MONOCYTES # BLD AUTO: 0.36 10*3/MM3 (ref 0.1–0.9)
MONOCYTES NFR BLD AUTO: 6.7 % (ref 5–12)
NEUTROPHILS NFR BLD AUTO: 3.57 10*3/MM3 (ref 1.7–7)
NEUTROPHILS NFR BLD AUTO: 66.8 % (ref 42.7–76)
NRBC BLD AUTO-RTO: 0 /100 WBC (ref 0–0.2)
PLATELET # BLD AUTO: 187 10*3/MM3 (ref 140–450)
PMV BLD AUTO: 10.6 FL (ref 6–12)
POTASSIUM SERPL-SCNC: 4 MMOL/L (ref 3.5–5.2)
PROT SERPL-MCNC: 6.8 G/DL (ref 6–8.5)
RBC # BLD AUTO: 4.47 10*6/MM3 (ref 3.77–5.28)
SODIUM SERPL-SCNC: 141 MMOL/L (ref 136–145)
TRIGL SERPL-MCNC: 107 MG/DL (ref 0–150)
TSH SERPL DL<=0.05 MIU/L-ACNC: 2.67 UIU/ML (ref 0.27–4.2)
VLDLC SERPL-MCNC: 19 MG/DL (ref 5–40)
WBC NRBC COR # BLD AUTO: 5.35 10*3/MM3 (ref 3.4–10.8)

## 2024-03-13 PROCEDURE — 82306 VITAMIN D 25 HYDROXY: CPT

## 2024-03-13 PROCEDURE — 84443 ASSAY THYROID STIM HORMONE: CPT

## 2024-03-13 PROCEDURE — 83036 HEMOGLOBIN GLYCOSYLATED A1C: CPT

## 2024-03-13 PROCEDURE — 85025 COMPLETE CBC W/AUTO DIFF WBC: CPT

## 2024-03-13 PROCEDURE — 80061 LIPID PANEL: CPT

## 2024-03-13 PROCEDURE — 80053 COMPREHEN METABOLIC PANEL: CPT

## 2024-03-13 PROCEDURE — 36415 COLL VENOUS BLD VENIPUNCTURE: CPT

## 2024-03-18 NOTE — PROGRESS NOTES
Encounter Date:04/03/2024  Last seen 9/27/2023      Patient ID: Deepika Sarabia is a 68 y.o. female.    Chief Complaint:     Hypertension  Dyslipidemia  Family history of coronary artery disease     History of Present Illness  Having right upper extremity tightness once in a while activity or associated shortness of breath.  Feels like she has some weakness.    Since I have last seen, the patient has been without any chest discomfort ,shortness of breath, palpitations, dizziness or syncope.  Denies having any headache ,abdominal pain ,nausea, vomiting , diarrhea constipation, loss of weight or loss of appetite.  Denies having any excessive bruising ,hematuria or blood in the stool.    Review of all systems negative except as indicated.    Reviewed ROS.    Assessment and Plan      ]]]]]]]]]]]]]]]]]]]]  History  =========  -Hypertension dyslipidemia.     Normal echocardiogram 2008     - Denies any surgical problems.     - Strong family history of heart disease.  Several members of the family had heart transplantation.     - Non-smoker     - No known allergies  =============  Plan  ===========  Having right upper extremity tightness once in a while activity or associated shortness of breath.  Feels like she has some weakness.  Does not appear to be cardiac.  Primary care evaluation if patient has continued symptoms.  Patient was asked to get in touch with me if she has any associated chest discomfort shortness of breath sweating etc.    Patient has strong family history of coronary artery disease.  Patient is asymptomatic without any cardiac or noncardiac symptoms at this time.  EKG showed sinus rhythm without ischemic changes.  9/27/2023  EKG 4/3/2024-sinus rhythm     Hypertension-blood pressure is well controlled.  125/85.  Continue on losartan and amlodipine once a day.     Dyslipidemia-recent total cholesterol 270 and .  Patient prefers to not go on any medications.  Diet and exercise was discussed.    Tai also discussed with her about starting medications but patient does not want to.  Have indicated to her about controlling her cholesterol better especially in view of significant family history of heart problems and coronary artery disease and heart transplantation.     Apple Watch indicated 1 short episode of A-fib lasting for few seconds in the past.  .  No other rhythm issues reported.  Patient was asymptomatic.  No further episodes.     Medications were reviewed and updated.     Follow-up in the office in 6 months.     Further plan will depend on patient's progress.     Reviewed and updated-4/3/2024.  ]]]]]]]]]]]]]]]]]]]             Diagnosis Plan   1. Essential hypertension        2. Dyslipidemia        3. Family history of coronary artery disease        LAB RESULTS (LAST 7 DAYS)    CBC  Results from last 7 days   Lab Units 03/13/24  1006   WBC 10*3/mm3 5.35   RBC 10*6/mm3 4.47   HEMOGLOBIN g/dL 13.6   HEMATOCRIT % 40.5   MCV fL 90.6   PLATELETS 10*3/mm3 187       BMP  Results from last 7 days   Lab Units 03/13/24  1006   SODIUM mmol/L 141   POTASSIUM mmol/L 4.0   CHLORIDE mmol/L 104   CO2 mmol/L 27.0   BUN mg/dL 13   CREATININE mg/dL 0.89   GLUCOSE mg/dL 98       CMP   Results from last 7 days   Lab Units 03/13/24  1006   SODIUM mmol/L 141   POTASSIUM mmol/L 4.0   CHLORIDE mmol/L 104   CO2 mmol/L 27.0   BUN mg/dL 13   CREATININE mg/dL 0.89   GLUCOSE mg/dL 98   ALBUMIN g/dL 4.5   BILIRUBIN mg/dL 0.6   ALK PHOS U/L 76   AST (SGOT) U/L 15   ALT (SGPT) U/L 14         BNP        TROPONIN        CoAg        Creatinine Clearance  CrCl cannot be calculated (Unknown ideal weight.).    ABG        Radiology  No radiology results for the last day                The following portions of the patient's history were reviewed and updated as appropriate: allergies, current medications, past family history, past medical history, past social history, past surgical history, and problem list.    Review of Systems    Constitutional: Negative for malaise/fatigue.   Cardiovascular:  Negative for chest pain, dyspnea on exertion, leg swelling and palpitations.   Respiratory:  Negative for cough and shortness of breath.    Gastrointestinal:  Negative for abdominal pain, nausea and vomiting.   Neurological:  Positive for numbness. Negative for dizziness, focal weakness, headaches and light-headedness.   All other systems reviewed and are negative.      Current Outpatient Medications:   •  amLODIPine (NORVASC) 5 MG tablet, , Disp: , Rfl:   •  Ibuprofen (ADVIL PO), ADVIL CAPS, Disp: , Rfl:   •  losartan (COZAAR) 100 MG tablet, , Disp: , Rfl:   •  sertraline (ZOLOFT) 50 MG tablet, , Disp: , Rfl:     No Known Allergies    Family History   Problem Relation Age of Onset   • Heart disease Mother         Cardiomyopathy   • Heart disease Brother         Heart transplant   • Heart disease Brother         Heart transplant   • Heart disease Brother         Heart transplant       No past surgical history on file.    Past Medical History:   Diagnosis Date   • Hyperlipidemia    • Hypertension        Family History   Problem Relation Age of Onset   • Heart disease Mother         Cardiomyopathy   • Heart disease Brother         Heart transplant   • Heart disease Brother         Heart transplant   • Heart disease Brother         Heart transplant       Social History     Socioeconomic History   • Marital status:    Tobacco Use   • Smoking status: Never     Passive exposure: Never   • Smokeless tobacco: Never   Vaping Use   • Vaping status: Never Used   Substance and Sexual Activity   • Alcohol use: Never   • Drug use: Never   • Sexual activity: Not Currently     Partners: Male     Birth control/protection: Pill, Post-menopausal           ECG 12 Lead    Date/Time: 4/3/2024 1:25 PM  Performed by: Gaudencio Young MD    Authorized by: Gaudencio Young MD  Comparison: compared with previous ECG   Similar to previous ECG  Comparison to previous  ECG: Normal sinus rhythm normal ECG 79/min normal axis normal intervals no ectopy no significant change from previous EKG.        Objective:       Physical Exam    There were no vitals taken for this visit.  The patient is alert, oriented and in no distress.    Vital signs as noted above.    Head and neck revealed no carotid bruits or jugular venous distension.  No thyromegaly or lymphadenopathy is present.    Lungs clear.  No wheezing.  Breath sounds are normal bilaterally.    Heart normal first and second heart sounds.  No murmur..  No pericardial rub is present.  No gallop is present.    Abdomen soft and nontender.  No organomegaly is present.    Extremities revealed good peripheral pulses without any pedal edema.    Skin warm and dry.    Musculoskeletal system is grossly normal.    CNS grossly normal.    Reviewed and updated.

## 2024-04-03 ENCOUNTER — OFFICE VISIT (OUTPATIENT)
Dept: CARDIOLOGY | Facility: CLINIC | Age: 69
End: 2024-04-03
Payer: MEDICARE

## 2024-04-03 VITALS
DIASTOLIC BLOOD PRESSURE: 85 MMHG | HEART RATE: 86 BPM | OXYGEN SATURATION: 95 % | WEIGHT: 193 LBS | BODY MASS INDEX: 32.95 KG/M2 | HEIGHT: 64 IN | SYSTOLIC BLOOD PRESSURE: 125 MMHG

## 2024-04-03 DIAGNOSIS — Z82.49 FAMILY HISTORY OF CORONARY ARTERY DISEASE: ICD-10-CM

## 2024-04-03 DIAGNOSIS — I10 ESSENTIAL HYPERTENSION: Primary | ICD-10-CM

## 2024-04-03 DIAGNOSIS — E78.5 DYSLIPIDEMIA: ICD-10-CM

## 2024-04-03 PROCEDURE — 93000 ELECTROCARDIOGRAM COMPLETE: CPT | Performed by: INTERNAL MEDICINE

## 2024-04-03 PROCEDURE — 1159F MED LIST DOCD IN RCRD: CPT | Performed by: INTERNAL MEDICINE

## 2024-04-03 PROCEDURE — 1160F RVW MEDS BY RX/DR IN RCRD: CPT | Performed by: INTERNAL MEDICINE

## 2024-04-03 PROCEDURE — 3074F SYST BP LT 130 MM HG: CPT | Performed by: INTERNAL MEDICINE

## 2024-04-03 PROCEDURE — 3079F DIAST BP 80-89 MM HG: CPT | Performed by: INTERNAL MEDICINE

## 2024-04-03 PROCEDURE — 99214 OFFICE O/P EST MOD 30 MIN: CPT | Performed by: INTERNAL MEDICINE

## 2024-10-14 NOTE — PROGRESS NOTES
Encounter Date:10/16/2024  Last seen 4/3/2024      Patient ID: Deepika Sarabia is a 69 y.o. female.  Chief Complaint:     Hypertension  Dyslipidemia  Family history of coronary artery disease     History of Present Illness  Since I have last seen, the patient has been without any chest discomfort ,shortness of breath, palpitations, dizziness or syncope.  Denies having any headache ,abdominal pain ,nausea, vomiting , diarrhea constipation, loss of weight or loss of appetite.  Denies having any excessive bruising ,hematuria or blood in the stool.    Review of all systems negative except as indicated.    Reviewed ROS.    Assessment and Plan      ]]]]]]]]]]]]]]]]]]]]  History  =========  -Hypertension dyslipidemia.     Normal echocardiogram 2008     - Denies any surgical problems.     - Strong family history of heart disease.  Several members of the family had heart transplantation.     - Non-smoker     - No known allergies  =============  Plan  ===========  History of having right upper extremity tightness once in a while activity or associated shortness of breath.  Improved  No further episodes     Patient has strong family history of coronary artery disease.  Patient is asymptomatic without any cardiac or noncardiac symptoms at this time.  EKG showed sinus rhythm without ischemic changes.  9/27/2023  EKG 4/3/2024-sinus rhythm  EKG 10/16/2024-sinus rhythm without ischemic changes     Hypertension-blood pressure is well controlled.  109/74  Continue on losartan and amlodipine once a day.     Dyslipidemia-total cholesterol 270 and .  Patient prefers to not go on any medications.  Diet and exercise was discussed.  Dr. Lujan also discussed with her about starting medications but patient does not want to.  Have indicated to her about controlling her cholesterol better especially in view of significant family history of heart problems and coronary artery disease and heart transplantation.      Medications were  reviewed and updated.     Follow-up in the office in 6 months.     Further plan will depend on patient's progress.     Reviewed and updated 10/16/2024.  ]]]]]]]]]]]]]]]]]]]          Diagnosis Plan   1. Essential hypertension        2. Dyslipidemia        3. Family history of coronary artery disease        LAB RESULTS (LAST 7 DAYS)    CBC        BMP        CMP         BNP        TROPONIN        CoAg        Creatinine Clearance  CrCl cannot be calculated (Patient's most recent lab result is older than the maximum 30 days allowed.).    ABG        Radiology  No radiology results for the last day                The following portions of the patient's history were reviewed and updated as appropriate: allergies, current medications, past family history, past medical history, past social history, past surgical history, and problem list.    Review of Systems   Constitutional: Negative for malaise/fatigue.   Cardiovascular:  Negative for chest pain, dyspnea on exertion, leg swelling and palpitations.   Respiratory:  Negative for cough and shortness of breath.    Gastrointestinal:  Negative for abdominal pain, nausea and vomiting.   Neurological:  Negative for dizziness, focal weakness, headaches, light-headedness and numbness.   All other systems reviewed and are negative.      Current Outpatient Medications:     amLODIPine (NORVASC) 5 MG tablet, , Disp: , Rfl:     Ibuprofen (ADVIL PO), ADVIL CAPS, Disp: , Rfl:     losartan (COZAAR) 100 MG tablet, , Disp: , Rfl:     sertraline (ZOLOFT) 50 MG tablet, , Disp: , Rfl:     No Known Allergies    Family History   Problem Relation Age of Onset    Heart disease Mother         Cardiomyopathy    Heart disease Brother         Heart transplant    Heart disease Brother         Heart transplant    Heart disease Brother         Heart transplant       No past surgical history on file.    Past Medical History:   Diagnosis Date    Hyperlipidemia     Hypertension        Family History   Problem  Relation Age of Onset    Heart disease Mother         Cardiomyopathy    Heart disease Brother         Heart transplant    Heart disease Brother         Heart transplant    Heart disease Brother         Heart transplant       Social History     Socioeconomic History    Marital status:    Tobacco Use    Smoking status: Never     Passive exposure: Never    Smokeless tobacco: Never   Vaping Use    Vaping status: Never Used   Substance and Sexual Activity    Alcohol use: Never    Drug use: Never    Sexual activity: Not Currently     Partners: Male     Birth control/protection: Pill, Post-menopausal           ECG 12 Lead    Date/Time: 10/16/2024 1:43 PM  Performed by: Gaudencio Young MD    Authorized by: Gaudencio Young MD  Comparison: compared with previous ECG   Similar to previous ECG  Comparison to previous ECG: Normal sinus rhythm normal ECG normal axis normal intervals no ectopy 60/min no significant change from previous EKG.        Objective:       Physical Exam    There were no vitals taken for this visit.  The patient is alert, oriented and in no distress.    Vital signs as noted above.    Head and neck revealed no carotid bruits or jugular venous distension.  No thyromegaly or lymphadenopathy is present.    Lungs clear.  No wheezing.  Breath sounds are normal bilaterally.    Heart normal first and second heart sounds.  No murmur..  No pericardial rub is present.  No gallop is present.    Abdomen soft and nontender.  No organomegaly is present.    Extremities revealed good peripheral pulses without any pedal edema.    Skin warm and dry.    Musculoskeletal system is grossly normal.    CNS grossly normal.    Reviewed and updated.

## 2024-10-16 ENCOUNTER — OFFICE VISIT (OUTPATIENT)
Dept: CARDIOLOGY | Facility: CLINIC | Age: 69
End: 2024-10-16
Payer: MEDICARE

## 2024-10-16 VITALS
WEIGHT: 179 LBS | BODY MASS INDEX: 30.56 KG/M2 | OXYGEN SATURATION: 99 % | SYSTOLIC BLOOD PRESSURE: 109 MMHG | DIASTOLIC BLOOD PRESSURE: 74 MMHG | HEART RATE: 66 BPM | HEIGHT: 64 IN

## 2024-10-16 DIAGNOSIS — E78.5 DYSLIPIDEMIA: ICD-10-CM

## 2024-10-16 DIAGNOSIS — Z82.49 FAMILY HISTORY OF CORONARY ARTERY DISEASE: ICD-10-CM

## 2024-10-16 DIAGNOSIS — I10 ESSENTIAL HYPERTENSION: Primary | ICD-10-CM

## 2024-10-16 PROCEDURE — 93000 ELECTROCARDIOGRAM COMPLETE: CPT | Performed by: INTERNAL MEDICINE

## 2024-10-16 PROCEDURE — 1159F MED LIST DOCD IN RCRD: CPT | Performed by: INTERNAL MEDICINE

## 2024-10-16 PROCEDURE — 3074F SYST BP LT 130 MM HG: CPT | Performed by: INTERNAL MEDICINE

## 2024-10-16 PROCEDURE — 1160F RVW MEDS BY RX/DR IN RCRD: CPT | Performed by: INTERNAL MEDICINE

## 2024-10-16 PROCEDURE — 3078F DIAST BP <80 MM HG: CPT | Performed by: INTERNAL MEDICINE

## 2024-10-16 PROCEDURE — 99214 OFFICE O/P EST MOD 30 MIN: CPT | Performed by: INTERNAL MEDICINE

## 2024-10-28 ENCOUNTER — LAB (OUTPATIENT)
Dept: LAB | Facility: HOSPITAL | Age: 69
End: 2024-10-28
Payer: MEDICARE

## 2024-10-28 ENCOUNTER — TRANSCRIBE ORDERS (OUTPATIENT)
Dept: ADMINISTRATIVE | Facility: HOSPITAL | Age: 69
End: 2024-10-28
Payer: MEDICARE

## 2024-10-28 DIAGNOSIS — I10 ESSENTIAL HYPERTENSION, MALIGNANT: ICD-10-CM

## 2024-10-28 DIAGNOSIS — E55.9 VITAMIN D DEFICIENCY: Primary | ICD-10-CM

## 2024-10-28 DIAGNOSIS — F34.1 DYSTHYMIC DISORDER: ICD-10-CM

## 2024-10-28 DIAGNOSIS — E55.9 VITAMIN D DEFICIENCY: ICD-10-CM

## 2024-10-28 DIAGNOSIS — E78.81 LIPOID DERMATOARTHRITIS: ICD-10-CM

## 2024-10-28 DIAGNOSIS — R73.03 DIABETES MELLITUS, LATENT: ICD-10-CM

## 2024-10-28 LAB
25(OH)D3 SERPL-MCNC: 30.6 NG/ML (ref 30–100)
ALBUMIN SERPL-MCNC: 4.1 G/DL (ref 3.5–5.2)
ALBUMIN UR-MCNC: 1.7 MG/DL
ALBUMIN/GLOB SERPL: 1.5 G/DL
ALP SERPL-CCNC: 66 U/L (ref 39–117)
ALT SERPL W P-5'-P-CCNC: 11 U/L (ref 1–33)
ANION GAP SERPL CALCULATED.3IONS-SCNC: 8 MMOL/L (ref 5–15)
AST SERPL-CCNC: 14 U/L (ref 1–32)
BASOPHILS # BLD AUTO: 0.02 10*3/MM3 (ref 0–0.2)
BASOPHILS NFR BLD AUTO: 0.4 % (ref 0–1.5)
BILIRUB SERPL-MCNC: 0.5 MG/DL (ref 0–1.2)
BUN SERPL-MCNC: 9 MG/DL (ref 8–23)
BUN/CREAT SERPL: 12.2 (ref 7–25)
CALCIUM SPEC-SCNC: 9.1 MG/DL (ref 8.6–10.5)
CHLORIDE SERPL-SCNC: 109 MMOL/L (ref 98–107)
CHOLEST SERPL-MCNC: 234 MG/DL (ref 0–200)
CO2 SERPL-SCNC: 25 MMOL/L (ref 22–29)
CREAT SERPL-MCNC: 0.74 MG/DL (ref 0.57–1)
DEPRECATED RDW RBC AUTO: 45.3 FL (ref 37–54)
EGFRCR SERPLBLD CKD-EPI 2021: 87.2 ML/MIN/1.73
EOSINOPHIL # BLD AUTO: 0.11 10*3/MM3 (ref 0–0.4)
EOSINOPHIL NFR BLD AUTO: 2.1 % (ref 0.3–6.2)
ERYTHROCYTE [DISTWIDTH] IN BLOOD BY AUTOMATED COUNT: 13 % (ref 12.3–15.4)
GLOBULIN UR ELPH-MCNC: 2.8 GM/DL
GLUCOSE SERPL-MCNC: 93 MG/DL (ref 65–99)
HBA1C MFR BLD: 5.5 % (ref 4.8–5.6)
HCT VFR BLD AUTO: 41.5 % (ref 34–46.6)
HDLC SERPL-MCNC: 54 MG/DL (ref 40–60)
HGB BLD-MCNC: 13.6 G/DL (ref 12–15.9)
IMM GRANULOCYTES # BLD AUTO: 0.02 10*3/MM3 (ref 0–0.05)
IMM GRANULOCYTES NFR BLD AUTO: 0.4 % (ref 0–0.5)
LDLC SERPL CALC-MCNC: 161 MG/DL (ref 0–100)
LDLC/HDLC SERPL: 2.93 {RATIO}
LYMPHOCYTES # BLD AUTO: 1.11 10*3/MM3 (ref 0.7–3.1)
LYMPHOCYTES NFR BLD AUTO: 21.1 % (ref 19.6–45.3)
MCH RBC QN AUTO: 31.1 PG (ref 26.6–33)
MCHC RBC AUTO-ENTMCNC: 32.8 G/DL (ref 31.5–35.7)
MCV RBC AUTO: 94.7 FL (ref 79–97)
MONOCYTES # BLD AUTO: 0.28 10*3/MM3 (ref 0.1–0.9)
MONOCYTES NFR BLD AUTO: 5.3 % (ref 5–12)
NEUTROPHILS NFR BLD AUTO: 3.71 10*3/MM3 (ref 1.7–7)
NEUTROPHILS NFR BLD AUTO: 70.7 % (ref 42.7–76)
NRBC BLD AUTO-RTO: 0 /100 WBC (ref 0–0.2)
PLATELET # BLD AUTO: 188 10*3/MM3 (ref 140–450)
PMV BLD AUTO: 10.3 FL (ref 6–12)
POTASSIUM SERPL-SCNC: 4.3 MMOL/L (ref 3.5–5.2)
PROT SERPL-MCNC: 6.9 G/DL (ref 6–8.5)
RBC # BLD AUTO: 4.38 10*6/MM3 (ref 3.77–5.28)
SODIUM SERPL-SCNC: 142 MMOL/L (ref 136–145)
TRIGL SERPL-MCNC: 108 MG/DL (ref 0–150)
TSH SERPL DL<=0.05 MIU/L-ACNC: 1.72 UIU/ML (ref 0.27–4.2)
VLDLC SERPL-MCNC: 19 MG/DL (ref 5–40)
WBC NRBC COR # BLD AUTO: 5.25 10*3/MM3 (ref 3.4–10.8)

## 2024-10-28 PROCEDURE — 80061 LIPID PANEL: CPT

## 2024-10-28 PROCEDURE — 82043 UR ALBUMIN QUANTITATIVE: CPT

## 2024-10-28 PROCEDURE — 83036 HEMOGLOBIN GLYCOSYLATED A1C: CPT

## 2024-10-28 PROCEDURE — 80053 COMPREHEN METABOLIC PANEL: CPT

## 2024-10-28 PROCEDURE — 36415 COLL VENOUS BLD VENIPUNCTURE: CPT

## 2024-10-28 PROCEDURE — 85025 COMPLETE CBC W/AUTO DIFF WBC: CPT

## 2024-10-28 PROCEDURE — 84443 ASSAY THYROID STIM HORMONE: CPT

## 2024-10-28 PROCEDURE — 82306 VITAMIN D 25 HYDROXY: CPT

## 2025-03-14 ENCOUNTER — HOSPITAL ENCOUNTER (EMERGENCY)
Facility: HOSPITAL | Age: 70
Discharge: HOME | End: 2025-03-14
Attending: EMERGENCY MEDICINE
Payer: MEDICARE

## 2025-03-14 ENCOUNTER — APPOINTMENT (OUTPATIENT)
Dept: GENERAL RADIOLOGY | Facility: HOSPITAL | Age: 70
End: 2025-03-14
Payer: MEDICARE

## 2025-03-14 VITALS
SYSTOLIC BLOOD PRESSURE: 133 MMHG | BODY MASS INDEX: 30.56 KG/M2 | DIASTOLIC BLOOD PRESSURE: 86 MMHG | WEIGHT: 179.01 LBS | RESPIRATION RATE: 18 BRPM | TEMPERATURE: 97.7 F | HEART RATE: 69 BPM | OXYGEN SATURATION: 99 % | HEIGHT: 64 IN

## 2025-03-14 DIAGNOSIS — S62.101A CLOSED FRACTURE OF RIGHT WRIST, INITIAL ENCOUNTER: Primary | ICD-10-CM

## 2025-03-14 PROCEDURE — 25010000002 BUPIVACAINE (PF) 0.5 % SOLUTION: Performed by: EMERGENCY MEDICINE

## 2025-03-14 PROCEDURE — 73110 X-RAY EXAM OF WRIST: CPT

## 2025-03-14 PROCEDURE — 99283 EMERGENCY DEPT VISIT LOW MDM: CPT

## 2025-03-14 RX ORDER — HYDROCODONE BITARTRATE AND ACETAMINOPHEN 5; 325 MG/1; MG/1
1 TABLET ORAL EVERY 6 HOURS PRN
Qty: 15 TABLET | Refills: 0 | Status: SHIPPED | OUTPATIENT
Start: 2025-03-14

## 2025-03-14 RX ORDER — BUPIVACAINE HYDROCHLORIDE 5 MG/ML
10 INJECTION, SOLUTION EPIDURAL; INTRACAUDAL ONCE
Status: COMPLETED | OUTPATIENT
Start: 2025-03-14 | End: 2025-03-14

## 2025-03-14 RX ADMIN — BUPIVACAINE HYDROCHLORIDE 10 ML: 5 INJECTION, SOLUTION EPIDURAL; INTRACAUDAL; PERINEURAL at 17:00

## 2025-03-14 NOTE — ED PROVIDER NOTES
EMERGENCY DEPARTMENT ENCOUNTER    Room Number:  42/42  Date of encounter:  3/14/2025  PCP: Tamie Lujan MD  Historian: Patient  Relevant information and history provided by sources other than the patient will be included below and in the ED Course.  Review of pertinent past medical records may also be included in record below and ED Course.    HPI:  Chief Complaint: Injury to right wrist  A complete HPI/ROS/PMH/PSH/SH/FH are unobtainable due to: Not applicable  Context: Deepika Sarabia is a 69 y.o. female who presents to the ED c/o today patient was playing with her grandchild fell on extended right hand and wrist and injured her right wrist.  This happened around 11:00 this morning.  She went to an urgent care center they did x-rays and they referred her here to the emergency department.  Denies any other injury.  She has no motor or sensory changes but has pain in that right wrist especially with movement.        Previous Episodes: No  Current Symptoms: Right wrist pain    MEDICAL HISTORY REVIEWED  History of hypertension and elevated cholesterol.  She is not on any blood thinning medicine.  I did review her medicine list      PAST MEDICAL HISTORY  Active Ambulatory Problems     Diagnosis Date Noted    High blood pressure 09/27/2023    Hypertension 09/27/2023    Pure hypercholesterolemia 09/27/2023     Resolved Ambulatory Problems     Diagnosis Date Noted    No Resolved Ambulatory Problems     Past Medical History:   Diagnosis Date    Hyperlipidemia          PAST SURGICAL HISTORY  No past surgical history on file.      FAMILY HISTORY  Family History   Problem Relation Age of Onset    Heart disease Mother         Cardiomyopathy    No Known Problems Father     No Known Problems Sister     Heart disease Brother         Heart transplant    Heart disease Brother         Heart transplant    Heart disease Brother         Heart transplant    No Known Problems Maternal Aunt     No Known Problems Maternal Uncle      No Known Problems Paternal Aunt     No Known Problems Paternal Uncle     No Known Problems Maternal Grandmother     No Known Problems Maternal Grandfather     No Known Problems Paternal Grandmother     No Known Problems Paternal Grandfather     No Known Problems Other     Anemia Neg Hx     Arrhythmia Neg Hx     Asthma Neg Hx     Clotting disorder Neg Hx     Fainting Neg Hx     Heart attack Neg Hx     Heart failure Neg Hx     Hyperlipidemia Neg Hx     Hypertension Neg Hx          SOCIAL HISTORY  Social History     Socioeconomic History    Marital status:    Tobacco Use    Smoking status: Never     Passive exposure: Never    Smokeless tobacco: Never   Vaping Use    Vaping status: Never Used   Substance and Sexual Activity    Alcohol use: Never    Drug use: Never    Sexual activity: Not Currently     Partners: Male     Birth control/protection: Pill, Post-menopausal         ALLERGIES  Patient has no known allergies.        REVIEW OF SYSTEMS  Review of Systems     All systems reviewed and negative except for those discussed in HPI.       PHYSICAL EXAM    I have reviewed the triage vital signs and nursing notes.    ED Triage Vitals   Temp Heart Rate Resp BP SpO2   03/14/25 1351 03/14/25 1351 03/14/25 1351 03/14/25 1356 03/14/25 1351   97.7 °F (36.5 °C) 66 18 147/69 99 %      Temp src Heart Rate Source Patient Position BP Location FiO2 (%)   -- -- -- -- --              GENERAL: Elderly female.  No acute distress.Vital signs on my initial evaluation been reviewed  HENT: nares patent  Head/neck/ face are symmetric without gross deformity, signs of trauma, or swelling  EYES: no scleral icterus, no conjunctival pallor.  NECK: Supple, no meningismus  CV: regular rhythm, regular rate with intact distal pulses.  RESPIRATORY: normal effort and no respiratory distress.  ABDOMEN: soft and nontender.  MUSCULOSKELETAL: Patient has swelling and deformity to her right wrist mainly in the radial aspect.  She is neurovascularly  intact.  Compartments are soft.  No coolness cyanosis or pallor with good strong intact distal pulses.  NEURO: alert and appropriate, moves all extremities, follows commands.  No focal motor or sensory changes  SKIN: warm, dry    Vital signs and nursing notes reviewed.        LAB RESULTS  No results found for this or any previous visit (from the past 24 hours).    Ordered the above labs and independently reviewed the results.        RADIOLOGY  XR Wrist 3+ View Right  Result Date: 3/14/2025  3 VIEW POSTREDUCTION RIGHT WRIST POST CASTING  HISTORY: Fracture.  The post reduction images through cast material again demonstrate a fracture of the distal radial metaphysis with marked improvement in the previous dorsal angulation. This includes a fracture line extending vertically through the distal radius to the articular surface without significant displacement. There is also a fracture of the ulnar styloid.  This report was finalized on 3/14/2025 5:31 PM by Dr. Tera Eaton M.D on Workstation: [id number]      XR Wrist 3+ View Right  Result Date: 3/14/2025  XR WRIST 3+ VW RIGHT-  INDICATIONS: Trauma  TECHNIQUE: 4 VIEWS OF THE RIGHT WRIST  COMPARISON: None available  FINDINGS:  Angulated, impacted fracture involving the distal radial metaphysis shows about 5 mm cortical step-off anteriorly. A fracture of the ulnar styloid is also noted. No other fractures are identified. Surrounding soft tissue swelling is seen. No dislocation. Mild osteoarthritic degenerative changes are apparent, especially laterally in the wrist.       Distal radius and ulna fractures.    This report was finalized on 3/14/2025 1:39 PM by Dr. Carlos Vargas M.D on Workstation: [id number]        I ordered the above noted radiological studies. Reviewed by me and discussed with radiologist.  See dictation for official radiology interpretation.      PROCEDURES    FX Dislocation    Date/Time: 3/14/2025 4:30 PM    Performed by: Brayan Reese,  MD  Authorized by: Brayan Reese MD    Consent:     Consent obtained:  Verbal    Consent given by:  Patient    Risks, benefits, and alternatives were discussed: yes      Alternatives discussed:  No treatment  Universal protocol:     Procedure explained and questions answered to patient or proxy's satisfaction: yes      Imaging studies available: yes      Required blood products, implants, devices, and special equipment available: no      Immediately prior to procedure, a time out was called: yes      Patient identity confirmed:  Verbally with patient  Injury:     Injury location:  Wrist    Wrist injury location:  R wrist    Wrist fracture type comment:  Impacted displaced right radius fracture and ulnar styloid fracture  Pre-procedure details:     Distal neurologic exam:  Normal    Distal perfusion: distal pulses strong      Range of motion: reduced      Range of motion comment:  Range of motion reduced secondary to pain  Sedation:     Sedation type: Hematoma block with bupivacaine 5 cc.  Anesthesia:     Anesthesia method:  Local infiltration    Local anesthetic:  Bupivacaine 0.5% w/o epi  Procedure details:     Manipulation performed: yes      Skin traction used: no      Skeletal traction used: no      Pin inserted: no      Reduction successful: yes      X-ray confirmed reduction: yes      Immobilization:  Splint and sling    Splint type:  Volar short arm    Supplies used:  Fiberglass and sling    Attestation: Splint applied and adjusted personally by me    Post-procedure details:     Distal neurologic exam:  Normal    Distal perfusion: distal pulses strong      Range of motion: not applicable      Procedure completion:  Tolerated    Fracture management: I provided definitive fracture management          MEDICATIONS GIVEN IN ER    Medications   bupivacaine (PF) (MARCAINE) 0.5 % injection 10 mL (10 mL Injection Given by Other 3/14/25 1700)         All labs have been independently reviewed by me.  All radiology  studies have been reviewed by me and I discussed with radiologist dictating the report when indicated below.  All EKG's independently viewed and interpreted by me.  Discussion below represents my analysis of pertinent findings related to patient's condition, differential diagnosis, treatment plan and final disposition.        PROGRESS, DATA ANALYSIS, CONSULTS, AND MEDICAL DECISION MAKING          ED Course as of 03/14/25 2017   Fri Mar 14, 2025   1535 I independently looked at the wrist x-rays that were done in the urgent care centers.  I also did review the radiologist report and interpretation.  There is an impacted fracture involving the distal radius with some cortical step-off there is also appearance of a fracture of the ulnar styloid.  Some soft tissue swelling is seen.  There is no dislocation appreciated. [MM]   1723 I looked at the images status post reduction.  Satisfactory reduction.  I did review the radiologist report as well [MM]   1723 The orthopedic surgeon Sudheer Lopez came down and saw the patient.  I did already reduced and had a splint applied.  He looked at the postreduction x-rays.  He is eduardo see the patient on Wednesday and his [place] office. [MM]   1723   He talked with the patient.  All questions answered [MM]   1723 Patient has tolerated pain medicine without problems in the past. [MM]      ED Course User Index  [MM] Brayan Reese MD       AS OF 20:17 EDT VITALS:    BP - 133/86  HR - 69  TEMP - 97.7 °F (36.5 °C)  02 SATS - 99%    SOCIAL DETERMINANTS OF HEALTH THAT IMPACT OR LIMIT CARE (For example..Homelessness,safe discharge, inability to obtain care, follow up, or prescriptions):      DIAGNOSIS  Final diagnoses:   Closed fracture of right wrist, initial encounter         DISPOSITION  DISCHARGE    Patient discharged in stable condition.    Reviewed implications of results, diagnosis, meds, responsibility to follow up, warning signs and symptoms of possible worsening, potential  complications and reasons to return to ER, including worsening of symptoms, worsening of pain, or other concerns..    Patient/Family voiced understanding of above instructions.    Discussed plan for discharge, as there is no emergent indication for admission. Pt/family is agreeable and understands need for follow up and repeat testing.  Pt is aware that discharge does not mean that nothing is wrong but it indicates no emergency is present that requires admission and they must continue care with follow-up as given below or physician of their choice.     FOLLOW-UP  Sudheer Lopez MD  [address] [address]  [phone]      From Wednesday as scheduled.  Do not use right arm keep in splint.  Return if any worsening of pain, fevers or chills.         Medication List        New Prescriptions      HYDROcodone-acetaminophen 5-325 MG per tablet  Commonly known as: NORCO  Take 1 tablet by mouth Every 6 (Six) Hours As Needed for Moderate Pain for up to 15 doses.               Where to Get Your Medications        You can get these medications from any pharmacy    Bring a paper prescription for each of these medications  HYDROcodone-acetaminophen 5-325 MG per tablet       Worsening of pain, new fever, rash, or any other concerns.          DICTATED UTILIZING DRAGON DICTATION    Note Disclaimer: At [facility], we believe that sharing information builds trust and better relationships. You are receiving this note because you recently visited [facility]. It is possible you will see health information before a provider has talked with you about it. This kind of information can be easy to misunderstand. To help you fully understand what it means for your health, we urge you to discuss this note with your provider.       Brayan Reese MD  03/14/25 2017

## 2025-03-14 NOTE — DISCHARGE INSTRUCTIONS
As we discussed keep arm and sling as well as splint.  Ice 30 to 45 minutes 5 times a day for the next 2 days.  Follow-up with orthopedic surgeon as scheduled on Wednesday in [place].  Take Tylenol and Motrin and use the Lortab for breakthrough pain.  Return sooner if any concerns.

## 2025-03-14 NOTE — ED NOTES
Pt arrives for a right wrist injury after a fall today. Pt came from urgent care and was diagnosed with a fracture to right distal end of radius

## 2025-04-11 ENCOUNTER — LAB (OUTPATIENT)
Dept: LAB | Facility: HOSPITAL | Age: 70
End: 2025-04-11
Payer: MEDICARE

## 2025-04-11 ENCOUNTER — TRANSCRIBE ORDERS (OUTPATIENT)
Dept: LAB | Facility: HOSPITAL | Age: 70
End: 2025-04-11
Payer: MEDICARE

## 2025-04-11 DIAGNOSIS — R73.03 DIABETES MELLITUS, LATENT: ICD-10-CM

## 2025-04-11 DIAGNOSIS — F34.1 DYSTHYMIC DISORDER: ICD-10-CM

## 2025-04-11 DIAGNOSIS — E55.9 AVITAMINOSIS D: ICD-10-CM

## 2025-04-11 DIAGNOSIS — I10 ESSENTIAL HYPERTENSION, MALIGNANT: ICD-10-CM

## 2025-04-11 DIAGNOSIS — E78.81 LIPOID DERMATOARTHRITIS: Primary | ICD-10-CM

## 2025-04-11 DIAGNOSIS — E78.81 LIPOID DERMATOARTHRITIS: ICD-10-CM

## 2025-04-11 DIAGNOSIS — Z00.00 ROUTINE GENERAL MEDICAL EXAMINATION AT A HEALTH CARE FACILITY: ICD-10-CM

## 2025-04-11 LAB
25(OH)D3 SERPL-MCNC: 39.9 NG/ML (ref 30–100)
ALBUMIN SERPL-MCNC: 4.6 G/DL (ref 3.5–5.2)
ALBUMIN/GLOB SERPL: 2.1 G/DL
ALP SERPL-CCNC: 71 U/L (ref 39–117)
ALT SERPL W P-5'-P-CCNC: 12 U/L (ref 1–33)
ANION GAP SERPL CALCULATED.3IONS-SCNC: 9.3 MMOL/L (ref 5–15)
AST SERPL-CCNC: 15 U/L (ref 1–32)
BASOPHILS # BLD AUTO: 0.01 10*3/MM3 (ref 0–0.2)
BASOPHILS NFR BLD AUTO: 0.2 % (ref 0–1.5)
BILIRUB SERPL-MCNC: 0.4 MG/DL (ref 0–1.2)
BUN SERPL-MCNC: 14 MG/DL (ref 8–23)
BUN/CREAT SERPL: 17.9 (ref 7–25)
CALCIUM SPEC-SCNC: 9.4 MG/DL (ref 8.6–10.5)
CHLORIDE SERPL-SCNC: 107 MMOL/L (ref 98–107)
CHOLEST SERPL-MCNC: 237 MG/DL (ref 0–200)
CO2 SERPL-SCNC: 26.7 MMOL/L (ref 22–29)
CREAT SERPL-MCNC: 0.78 MG/DL (ref 0.57–1)
DEPRECATED RDW RBC AUTO: 44.2 FL (ref 37–54)
EGFRCR SERPLBLD CKD-EPI 2021: 81.3 ML/MIN/1.73
EOSINOPHIL # BLD AUTO: 0.16 10*3/MM3 (ref 0–0.4)
EOSINOPHIL NFR BLD AUTO: 3.3 % (ref 0.3–6.2)
ERYTHROCYTE [DISTWIDTH] IN BLOOD BY AUTOMATED COUNT: 13.2 % (ref 12.3–15.4)
GLOBULIN UR ELPH-MCNC: 2.2 GM/DL
GLUCOSE SERPL-MCNC: 87 MG/DL (ref 65–99)
HBA1C MFR BLD: 5.36 % (ref 4.8–5.6)
HCT VFR BLD AUTO: 37.5 % (ref 34–46.6)
HDLC SERPL-MCNC: 62 MG/DL (ref 40–60)
HGB BLD-MCNC: 12.6 G/DL (ref 12–15.9)
IMM GRANULOCYTES # BLD AUTO: 0.01 10*3/MM3 (ref 0–0.05)
IMM GRANULOCYTES NFR BLD AUTO: 0.2 % (ref 0–0.5)
LDLC SERPL CALC-MCNC: 161 MG/DL (ref 0–100)
LDLC/HDLC SERPL: 2.55 {RATIO}
LYMPHOCYTES # BLD AUTO: 1.05 10*3/MM3 (ref 0.7–3.1)
LYMPHOCYTES NFR BLD AUTO: 21.5 % (ref 19.6–45.3)
MCH RBC QN AUTO: 30.7 PG (ref 26.6–33)
MCHC RBC AUTO-ENTMCNC: 33.6 G/DL (ref 31.5–35.7)
MCV RBC AUTO: 91.5 FL (ref 79–97)
MONOCYTES # BLD AUTO: 0.33 10*3/MM3 (ref 0.1–0.9)
MONOCYTES NFR BLD AUTO: 6.8 % (ref 5–12)
NEUTROPHILS NFR BLD AUTO: 3.32 10*3/MM3 (ref 1.7–7)
NEUTROPHILS NFR BLD AUTO: 68 % (ref 42.7–76)
NRBC BLD AUTO-RTO: 0 /100 WBC (ref 0–0.2)
PLATELET # BLD AUTO: 182 10*3/MM3 (ref 140–450)
PMV BLD AUTO: 10.7 FL (ref 6–12)
POTASSIUM SERPL-SCNC: 4.1 MMOL/L (ref 3.5–5.2)
PROT SERPL-MCNC: 6.8 G/DL (ref 6–8.5)
RBC # BLD AUTO: 4.1 10*6/MM3 (ref 3.77–5.28)
SODIUM SERPL-SCNC: 143 MMOL/L (ref 136–145)
TRIGL SERPL-MCNC: 83 MG/DL (ref 0–150)
TSH SERPL DL<=0.05 MIU/L-ACNC: 2.29 UIU/ML (ref 0.27–4.2)
VLDLC SERPL-MCNC: 14 MG/DL (ref 5–40)
WBC NRBC COR # BLD AUTO: 4.88 10*3/MM3 (ref 3.4–10.8)

## 2025-04-11 PROCEDURE — 80053 COMPREHEN METABOLIC PANEL: CPT

## 2025-04-11 PROCEDURE — 85025 COMPLETE CBC W/AUTO DIFF WBC: CPT

## 2025-04-11 PROCEDURE — 80061 LIPID PANEL: CPT

## 2025-04-11 PROCEDURE — 83036 HEMOGLOBIN GLYCOSYLATED A1C: CPT

## 2025-04-11 PROCEDURE — 84443 ASSAY THYROID STIM HORMONE: CPT

## 2025-04-11 PROCEDURE — 36415 COLL VENOUS BLD VENIPUNCTURE: CPT

## 2025-04-11 PROCEDURE — 82306 VITAMIN D 25 HYDROXY: CPT

## 2025-04-13 NOTE — PROGRESS NOTES
Encounter Date:04/16/2025    Last seen-10/16/2024      Patient ID: Deepika Sarabia is a 70 y.o. female.      Chief Complaint:     Hypertension  Dyslipidemia  Family history of coronary artery disease    History of Present Illness  Since I have last seen, the patient has been without any chest discomfort ,shortness of breath, palpitations, dizziness or syncope.  Denies having any headache ,abdominal pain ,nausea, vomiting , diarrhea constipation, loss of weight or loss of appetite.  Denies having any excessive bruising ,hematuria or blood in the stool.    Review of all systems negative except as indicated.    Reviewed ROS.  Assessment and Plan      ]]]]]]]]]]]]]]]]]]]]  History  =========  -Hypertension dyslipidemia.     Normal echocardiogram 2008     - Denies any surgical problems.     - Strong family history of heart disease.  Several members of the family had heart transplantation.     - Non-smoker     - No known allergies  =============  Plan  ===========  Patient recently had broken wrist.     Patient has strong family history of coronary artery disease.  Patient is asymptomatic from cardiac standpoint.  Patient is planning on having CT calcium score.    EKG 4/16/2025-sinus rhythm without ischemic changes  EKG showed sinus rhythm without ischemic changes.  9/27/2023  EKG 4/3/2024-sinus rhythm  EKG 10/16/2024-sinus rhythm without ischemic changes     Hypertension-well-controlled  127/85.  Continue on losartan and amlodipine once a day.     Dyslipidemia total cholesterol 270 and .  Patient prefers to not go on any medications.  Diet and exercise was discussed.  Dr. Lujan also discussed with her about starting medications but patient does not want to.  Have indicated to her about controlling her cholesterol better especially in view of significant family history of heart problems and coronary artery disease and heart transplantation.      Medications were reviewed and updated.    Follow-up in the office  in 6 months.    Further plan will depend on patient's progress.    Reviewed and updated 4/16/2025.  ]]]]]]]]]]]]]]]]]]]       Diagnosis Plan   1. Essential hypertension        2. Dyslipidemia        3. Family history of coronary artery disease        LAB RESULTS (LAST 7 DAYS)    CBC  Results from last 7 days   Lab Units 04/11/25  0947   WBC 10*3/mm3 4.88   RBC 10*6/mm3 4.10   HEMOGLOBIN g/dL 12.6   HEMATOCRIT % 37.5   MCV fL 91.5   PLATELETS 10*3/mm3 182       BMP  Results from last 7 days   Lab Units 04/11/25  0947   SODIUM mmol/L 143   POTASSIUM mmol/L 4.1   CHLORIDE mmol/L 107   CO2 mmol/L 26.7   BUN mg/dL 14   CREATININE mg/dL 0.78   GLUCOSE mg/dL 87       CMP   Results from last 7 days   Lab Units 04/11/25  0947   SODIUM mmol/L 143   POTASSIUM mmol/L 4.1   CHLORIDE mmol/L 107   CO2 mmol/L 26.7   BUN mg/dL 14   CREATININE mg/dL 0.78   GLUCOSE mg/dL 87   ALBUMIN g/dL 4.6   BILIRUBIN mg/dL 0.4   ALK PHOS U/L 71   AST (SGOT) U/L 15   ALT (SGPT) U/L 12         BNP        TROPONIN        CoAg        Creatinine Clearance  Estimated Creatinine Clearance: 67.5 mL/min (by C-G formula based on SCr of 0.78 mg/dL).    ABG        Radiology  No radiology results for the last day                The following portions of the patient's history were reviewed and updated as appropriate: allergies, current medications, past family history, past medical history, past social history, past surgical history, and problem list.    Review of Systems   Constitutional: Negative for malaise/fatigue.   Cardiovascular:  Negative for chest pain, leg swelling, palpitations and syncope.   Respiratory:  Negative for shortness of breath.    Skin:  Negative for rash.   Gastrointestinal:  Negative for nausea and vomiting.   Neurological:  Negative for dizziness, light-headedness and numbness.   All other systems reviewed and are negative.        Current Outpatient Medications:     HYDROcodone-acetaminophen (NORCO) 5-325 MG per tablet, Take 1 tablet  by mouth Every 6 (Six) Hours As Needed for Moderate Pain for up to 15 doses., Disp: 15 tablet, Rfl: 0    Ibuprofen (ADVIL PO), ADVIL CAPS, Disp: , Rfl:     losartan (COZAAR) 100 MG tablet, Take 1 tablet by mouth Daily., Disp: , Rfl:     sertraline (ZOLOFT) 50 MG tablet, Take 1 tablet by mouth Daily., Disp: , Rfl:     No Known Allergies    Family History   Problem Relation Age of Onset    Heart disease Mother         Cardiomyopathy    No Known Problems Father     No Known Problems Sister     Heart disease Brother         Heart transplant    Heart disease Brother         Heart transplant    Heart disease Brother         Heart transplant    No Known Problems Maternal Aunt     No Known Problems Maternal Uncle     No Known Problems Paternal Aunt     No Known Problems Paternal Uncle     No Known Problems Maternal Grandmother     No Known Problems Maternal Grandfather     No Known Problems Paternal Grandmother     No Known Problems Paternal Grandfather     No Known Problems Other     Anemia Neg Hx     Arrhythmia Neg Hx     Asthma Neg Hx     Clotting disorder Neg Hx     Fainting Neg Hx     Heart attack Neg Hx     Heart failure Neg Hx     Hyperlipidemia Neg Hx     Hypertension Neg Hx        No past surgical history on file.    Past Medical History:   Diagnosis Date    Hyperlipidemia     Hypertension        Family History   Problem Relation Age of Onset    Heart disease Mother         Cardiomyopathy    No Known Problems Father     No Known Problems Sister     Heart disease Brother         Heart transplant    Heart disease Brother         Heart transplant    Heart disease Brother         Heart transplant    No Known Problems Maternal Aunt     No Known Problems Maternal Uncle     No Known Problems Paternal Aunt     No Known Problems Paternal Uncle     No Known Problems Maternal Grandmother     No Known Problems Maternal Grandfather     No Known Problems Paternal Grandmother     No Known Problems Paternal Grandfather     No  "Known Problems Other     Anemia Neg Hx     Arrhythmia Neg Hx     Asthma Neg Hx     Clotting disorder Neg Hx     Fainting Neg Hx     Heart attack Neg Hx     Heart failure Neg Hx     Hyperlipidemia Neg Hx     Hypertension Neg Hx        Social History     Socioeconomic History    Marital status:    Tobacco Use    Smoking status: Never     Passive exposure: Never    Smokeless tobacco: Never   Vaping Use    Vaping status: Never Used   Substance and Sexual Activity    Alcohol use: Never    Drug use: Never    Sexual activity: Not Currently     Partners: Male     Birth control/protection: Pill, Post-menopausal           ECG 12 Lead    Date/Time: 4/16/2025 12:28 PM  Performed by: Gaudencio Young MD    Authorized by: Gaudencio Young MD  Comparison: compared with previous ECG   Similar to previous ECG  Comparison to previous ECG: Normal sinus rhythm nonspecific ST-T wave changes 68/min normal axis normal intervals no ectopy no significant change from previous EKG.        Objective:       Physical Exam    /85 (BP Location: Left arm, Patient Position: Sitting, Cuff Size: Adult)   Pulse 68   Ht 162.6 cm (64\")   Wt 79.4 kg (175 lb)   LMP  (LMP Unknown)   SpO2 96%   BMI 30.04 kg/m²   The patient is alert, oriented and in no distress.    Vital signs as noted above.     Head and neck revealed no carotid bruits or jugular venous distension.  No thyromegaly or lymphadenopathy is present.     Lungs clear.  No wheezing.  Breath sounds are normal bilaterally.     Heart normal first and second heart sounds.  No murmur..  No pericardial rub is present.  No gallop is present.     Abdomen soft and nontender.  No organomegaly is present.     Extremities revealed good peripheral pulses without any pedal edema.     Skin warm and dry.     Musculoskeletal system is grossly normal.     CNS grossly normal.    Reviewed and updated.      "

## 2025-04-16 ENCOUNTER — OFFICE VISIT (OUTPATIENT)
Dept: CARDIOLOGY | Facility: CLINIC | Age: 70
End: 2025-04-16
Payer: MEDICARE

## 2025-04-16 VITALS
WEIGHT: 175 LBS | HEART RATE: 68 BPM | OXYGEN SATURATION: 96 % | DIASTOLIC BLOOD PRESSURE: 85 MMHG | BODY MASS INDEX: 29.88 KG/M2 | SYSTOLIC BLOOD PRESSURE: 127 MMHG | HEIGHT: 64 IN

## 2025-04-16 DIAGNOSIS — E78.5 DYSLIPIDEMIA: ICD-10-CM

## 2025-04-16 DIAGNOSIS — I10 ESSENTIAL HYPERTENSION: Primary | ICD-10-CM

## 2025-04-16 DIAGNOSIS — Z82.49 FAMILY HISTORY OF CORONARY ARTERY DISEASE: ICD-10-CM

## 2025-04-16 PROCEDURE — 93000 ELECTROCARDIOGRAM COMPLETE: CPT | Performed by: INTERNAL MEDICINE

## 2025-04-16 PROCEDURE — 3074F SYST BP LT 130 MM HG: CPT | Performed by: INTERNAL MEDICINE

## 2025-04-16 PROCEDURE — 99214 OFFICE O/P EST MOD 30 MIN: CPT | Performed by: INTERNAL MEDICINE

## 2025-04-16 PROCEDURE — 1159F MED LIST DOCD IN RCRD: CPT | Performed by: INTERNAL MEDICINE

## 2025-04-16 PROCEDURE — 3079F DIAST BP 80-89 MM HG: CPT | Performed by: INTERNAL MEDICINE

## 2025-04-16 PROCEDURE — 1160F RVW MEDS BY RX/DR IN RCRD: CPT | Performed by: INTERNAL MEDICINE
